# Patient Record
Sex: FEMALE | Race: WHITE | NOT HISPANIC OR LATINO | Employment: FULL TIME | URBAN - METROPOLITAN AREA
[De-identification: names, ages, dates, MRNs, and addresses within clinical notes are randomized per-mention and may not be internally consistent; named-entity substitution may affect disease eponyms.]

---

## 2022-07-15 ENCOUNTER — HOSPITAL ENCOUNTER (INPATIENT)
Facility: HOSPITAL | Age: 52
LOS: 2 days | Discharge: PSYCHIATRIC OR SUBSTANCE USE FACILITY | DRG: 917 | End: 2022-07-17
Attending: STUDENT IN AN ORGANIZED HEALTH CARE EDUCATION/TRAINING PROGRAM | Admitting: INTERNAL MEDICINE

## 2022-07-15 DIAGNOSIS — T39.1X2A ACETAMINOPHEN OVERDOSE, INTENTIONAL SELF-HARM, INITIAL ENCOUNTER: ICD-10-CM

## 2022-07-15 DIAGNOSIS — R45.851 SUICIDAL IDEATION: ICD-10-CM

## 2022-07-15 DIAGNOSIS — T50.902A INTENTIONAL DRUG OVERDOSE, INITIAL ENCOUNTER: Primary | ICD-10-CM

## 2022-07-15 DIAGNOSIS — T48.1X2A: ICD-10-CM

## 2022-07-15 DIAGNOSIS — T50.901A OVERDOSE: ICD-10-CM

## 2022-07-15 DIAGNOSIS — R07.9 CHEST PAIN: ICD-10-CM

## 2022-07-15 DIAGNOSIS — T40.602A INTENTIONAL OPIATE OVERDOSE, INITIAL ENCOUNTER: ICD-10-CM

## 2022-07-15 DIAGNOSIS — Z00.8 MEDICAL CLEARANCE FOR PSYCHIATRIC ADMISSION: ICD-10-CM

## 2022-07-15 PROBLEM — K21.9 GASTROESOPHAGEAL REFLUX DISEASE: Status: ACTIVE | Noted: 2022-07-15

## 2022-07-15 PROBLEM — Z80.0 FAMILY HISTORY OF COLON CANCER: Status: ACTIVE | Noted: 2022-01-21

## 2022-07-15 PROBLEM — Z90.710 HISTORY OF HYSTERECTOMY: Status: ACTIVE | Noted: 2022-01-21

## 2022-07-15 PROBLEM — Z72.0 TOBACCO USER: Status: ACTIVE | Noted: 2022-07-15

## 2022-07-15 PROBLEM — I10 PRIMARY HYPERTENSION: Status: ACTIVE | Noted: 2022-02-04

## 2022-07-15 PROBLEM — F32.A ACUTE DEPRESSION: Status: ACTIVE | Noted: 2022-07-15

## 2022-07-15 PROBLEM — M51.06 INTERVERTEBRAL DISC DISORDER OF LUMBAR REGION WITH MYELOPATHY: Status: ACTIVE | Noted: 2022-07-15

## 2022-07-15 PROBLEM — F31.9 BIPOLAR DISORDER: Status: ACTIVE | Noted: 2022-01-21

## 2022-07-15 LAB
ALBUMIN SERPL-MCNC: 3.8 GM/DL (ref 3.5–5)
ALBUMIN/GLOB SERPL: 1.4 RATIO (ref 1.1–2)
ALP SERPL-CCNC: 57 UNIT/L (ref 40–150)
ALT SERPL-CCNC: 27 UNIT/L (ref 0–55)
AMPHET UR QL SCN: NEGATIVE
APAP SERPL-MCNC: 148.5 UG/ML (ref 17.4–30)
APAP SERPL-MCNC: 74.1 UG/ML (ref 17.4–30)
APPEARANCE UR: CLEAR
APTT PPP: 26.1 SECONDS (ref 23.4–33.9)
AST SERPL-CCNC: 40 UNIT/L (ref 5–34)
BARBITURATE SCN PRESENT UR: NEGATIVE
BASE EXCESS ARTERIAL: -2 MMOL/L (ref -2–2)
BASE EXCESS ARTERIAL: ABNORMAL
BASOPHILS # BLD AUTO: 0.01 X10(3)/MCL (ref 0–0.2)
BASOPHILS NFR BLD AUTO: 0.2 %
BENZODIAZ UR QL SCN: NEGATIVE
BILIRUB UR QL STRIP.AUTO: NEGATIVE MG/DL
BILIRUBIN DIRECT+TOT PNL SERPL-MCNC: 0.2 MG/DL
BUN SERPL-MCNC: 5.8 MG/DL (ref 9.8–20.1)
CALCIUM SERPL-MCNC: 9 MG/DL (ref 8.4–10.2)
CANNABINOIDS UR QL SCN: NEGATIVE
CHLORIDE SERPL-SCNC: 102 MMOL/L (ref 98–107)
CO2 SERPL-SCNC: 25 MMOL/L (ref 22–29)
CO2 TOTAL: 25
COCAINE UR QL SCN: NEGATIVE
COLOR UR AUTO: ABNORMAL
CREAT SERPL-MCNC: 0.62 MG/DL (ref 0.55–1.02)
EOSINOPHIL # BLD AUTO: 0.02 X10(3)/MCL (ref 0–0.9)
EOSINOPHIL NFR BLD AUTO: 0.3 %
ERYTHROCYTE [DISTWIDTH] IN BLOOD BY AUTOMATED COUNT: 12.9 % (ref 11.5–17)
ETHANOL SERPL-MCNC: 11 MG/DL
GLOBULIN SER-MCNC: 2.8 GM/DL (ref 2.4–3.5)
GLUCOSE SERPL-MCNC: 164 MG/DL (ref 74–100)
GLUCOSE UR QL STRIP.AUTO: 250 MG/DL
HCO3 ARTERIAL: 24 MMOL/L (ref 18–23)
HCO3 ARTERIAL: ABNORMAL
HCO3 UR-SCNC: 24 MMOL/L (ref 24–28)
HCO3 UR-SCNC: 25.6 MMOL/L (ref 24–28)
HCT VFR BLD AUTO: 38 % (ref 37–47)
HGB BLD-MCNC: 12.8 GM/DL (ref 12–16)
IMM GRANULOCYTES # BLD AUTO: 0.01 X10(3)/MCL (ref 0–0.04)
IMM GRANULOCYTES NFR BLD AUTO: 0.2 %
INR BLD: 0.95 (ref 2–3)
KETONES UR QL STRIP.AUTO: NEGATIVE MG/DL
LEUKOCYTE ESTERASE UR QL STRIP.AUTO: NEGATIVE UNIT/L
LYMPHOCYTES # BLD AUTO: 1.79 X10(3)/MCL (ref 0.6–4.6)
LYMPHOCYTES NFR BLD AUTO: 28.6 %
MCH RBC QN AUTO: 30.8 PG (ref 27–31)
MCHC RBC AUTO-ENTMCNC: 33.7 MG/DL (ref 33–36)
MCV RBC AUTO: 91.3 FL (ref 80–94)
MDMA UR QL SCN: NEGATIVE
MONOCYTES # BLD AUTO: 0.41 X10(3)/MCL (ref 0.1–1.3)
MONOCYTES NFR BLD AUTO: 6.6 %
NEUTROPHILS # BLD AUTO: 4 X10(3)/MCL (ref 2.1–9.2)
NEUTROPHILS NFR BLD AUTO: 64.1 %
NITRITE UR QL STRIP.AUTO: NEGATIVE
NRBC BLD AUTO-RTO: 0 %
O2 SATURATION ARTERIAL: 93 % (ref 95–98)
OPIATES UR QL SCN: POSITIVE
PCO2 ARTERIAL: 45 MM HG (ref 35–45)
PCO2 BLDA: 44.9 MMHG (ref 35–45)
PCO2 BLDA: 45.2 MMHG (ref 35–45)
PCO2 BLDA: ABNORMAL MM[HG]
PCP UR QL: NEGATIVE
PH ARTERIAL: 7.33 (ref 7.35–7.45)
PH SMN: 7.33 [PH] (ref 7.35–7.45)
PH SMN: 7.36 [PH] (ref 7.35–7.45)
PH UR STRIP.AUTO: 6 [PH]
PH UR: 6 [PH] (ref 3–11)
PLATELET # BLD AUTO: 263 X10(3)/MCL (ref 130–400)
PMV BLD AUTO: 9.7 FL (ref 7.4–10.4)
PO2 ARTERIAL: 72 MM HG (ref 83–108)
PO2 BLDA: 72 MMHG (ref 80–100)
PO2 BLDA: 78 MMHG (ref 80–100)
POC BE: -2 MMOL/L
POC BE: 0 MMOL/L
POC COHB: ABNORMAL
POC METHB: ABNORMAL
POC O2HB: ABNORMAL
POC SATURATED O2: 93 % (ref 95–100)
POC SATURATED O2: 95 % (ref 95–100)
POC TCO2: 25 MMOL/L (ref 23–27)
POC TCO2: 27 MMOL/L (ref 23–27)
POTASSIUM SERPL-SCNC: 3.3 MMOL/L (ref 3.5–5.1)
PROT SERPL-MCNC: 6.6 GM/DL (ref 6.4–8.3)
PROT UR QL STRIP.AUTO: NEGATIVE MG/DL
PROTHROMBIN TIME: 12.5 SECONDS (ref 11.7–14.5)
RBC # BLD AUTO: 4.16 X10(6)/MCL (ref 4.2–5.4)
RBC UR QL AUTO: NEGATIVE UNIT/L
SALICYLATES SERPL-MCNC: <5 MG/DL
SAMPLE: ABNORMAL
SAMPLE: ABNORMAL
SARS-COV-2 RDRP RESP QL NAA+PROBE: NEGATIVE
SATURATED O2 ARTERIAL, I-STAT: ABNORMAL
SODIUM SERPL-SCNC: 141 MMOL/L (ref 136–145)
SP GR UR STRIP.AUTO: 1.02
SPECIFIC GRAVITY, URINE AUTO (.000) (OHS): 1.02 (ref 1–1.03)
TSH SERPL-ACNC: 0.79 UIU/ML (ref 0.35–4.94)
UROBILINOGEN UR STRIP-ACNC: 0.2 MG/DL
WBC # SPEC AUTO: 6.3 X10(3)/MCL (ref 4.5–11.5)

## 2022-07-15 PROCEDURE — 80307 DRUG TEST PRSMV CHEM ANLYZR: CPT | Performed by: STUDENT IN AN ORGANIZED HEALTH CARE EDUCATION/TRAINING PROGRAM

## 2022-07-15 PROCEDURE — 99900035 HC TECH TIME PER 15 MIN (STAT)

## 2022-07-15 PROCEDURE — 80143 DRUG ASSAY ACETAMINOPHEN: CPT | Performed by: INTERNAL MEDICINE

## 2022-07-15 PROCEDURE — 36600 WITHDRAWAL OF ARTERIAL BLOOD: CPT

## 2022-07-15 PROCEDURE — 85730 THROMBOPLASTIN TIME PARTIAL: CPT | Performed by: STUDENT IN AN ORGANIZED HEALTH CARE EDUCATION/TRAINING PROGRAM

## 2022-07-15 PROCEDURE — 93010 ELECTROCARDIOGRAM REPORT: CPT | Mod: ,,, | Performed by: INTERNAL MEDICINE

## 2022-07-15 PROCEDURE — 87635 SARS-COV-2 COVID-19 AMP PRB: CPT | Performed by: STUDENT IN AN ORGANIZED HEALTH CARE EDUCATION/TRAINING PROGRAM

## 2022-07-15 PROCEDURE — 82803 BLOOD GASES ANY COMBINATION: CPT

## 2022-07-15 PROCEDURE — 81003 URINALYSIS AUTO W/O SCOPE: CPT | Performed by: STUDENT IN AN ORGANIZED HEALTH CARE EDUCATION/TRAINING PROGRAM

## 2022-07-15 PROCEDURE — 93010 EKG 12-LEAD: ICD-10-PCS | Mod: ,,, | Performed by: INTERNAL MEDICINE

## 2022-07-15 PROCEDURE — 80053 COMPREHEN METABOLIC PANEL: CPT | Performed by: STUDENT IN AN ORGANIZED HEALTH CARE EDUCATION/TRAINING PROGRAM

## 2022-07-15 PROCEDURE — 63600175 PHARM REV CODE 636 W HCPCS: Performed by: STUDENT IN AN ORGANIZED HEALTH CARE EDUCATION/TRAINING PROGRAM

## 2022-07-15 PROCEDURE — 99291 CRITICAL CARE FIRST HOUR: CPT | Mod: 25

## 2022-07-15 PROCEDURE — 25000003 PHARM REV CODE 250: Performed by: STUDENT IN AN ORGANIZED HEALTH CARE EDUCATION/TRAINING PROGRAM

## 2022-07-15 PROCEDURE — 63600175 PHARM REV CODE 636 W HCPCS: Performed by: INTERNAL MEDICINE

## 2022-07-15 PROCEDURE — 80179 DRUG ASSAY SALICYLATE: CPT | Performed by: STUDENT IN AN ORGANIZED HEALTH CARE EDUCATION/TRAINING PROGRAM

## 2022-07-15 PROCEDURE — 93005 ELECTROCARDIOGRAM TRACING: CPT

## 2022-07-15 PROCEDURE — 85025 COMPLETE CBC W/AUTO DIFF WBC: CPT | Performed by: STUDENT IN AN ORGANIZED HEALTH CARE EDUCATION/TRAINING PROGRAM

## 2022-07-15 PROCEDURE — 82077 ASSAY SPEC XCP UR&BREATH IA: CPT | Performed by: STUDENT IN AN ORGANIZED HEALTH CARE EDUCATION/TRAINING PROGRAM

## 2022-07-15 PROCEDURE — 84443 ASSAY THYROID STIM HORMONE: CPT | Performed by: STUDENT IN AN ORGANIZED HEALTH CARE EDUCATION/TRAINING PROGRAM

## 2022-07-15 PROCEDURE — 96374 THER/PROPH/DIAG INJ IV PUSH: CPT

## 2022-07-15 PROCEDURE — 96361 HYDRATE IV INFUSION ADD-ON: CPT

## 2022-07-15 PROCEDURE — 36415 COLL VENOUS BLD VENIPUNCTURE: CPT | Performed by: STUDENT IN AN ORGANIZED HEALTH CARE EDUCATION/TRAINING PROGRAM

## 2022-07-15 PROCEDURE — 20000000 HC ICU ROOM

## 2022-07-15 PROCEDURE — 85610 PROTHROMBIN TIME: CPT | Performed by: STUDENT IN AN ORGANIZED HEALTH CARE EDUCATION/TRAINING PROGRAM

## 2022-07-15 PROCEDURE — 80143 DRUG ASSAY ACETAMINOPHEN: CPT | Performed by: STUDENT IN AN ORGANIZED HEALTH CARE EDUCATION/TRAINING PROGRAM

## 2022-07-15 RX ORDER — NALOXONE HCL 0.4 MG/ML
1 VIAL (ML) INJECTION
Status: COMPLETED | OUTPATIENT
Start: 2022-07-15 | End: 2022-07-15

## 2022-07-15 RX ORDER — SODIUM CHLORIDE, SODIUM LACTATE, POTASSIUM CHLORIDE, CALCIUM CHLORIDE 600; 310; 30; 20 MG/100ML; MG/100ML; MG/100ML; MG/100ML
INJECTION, SOLUTION INTRAVENOUS CONTINUOUS
Status: DISCONTINUED | OUTPATIENT
Start: 2022-07-15 | End: 2022-07-17

## 2022-07-15 RX ORDER — VENLAFAXINE HYDROCHLORIDE 150 MG/1
150 TABLET, EXTENDED RELEASE ORAL
Status: ON HOLD | COMMUNITY
Start: 2021-05-17 | End: 2022-07-17 | Stop reason: HOSPADM

## 2022-07-15 RX ORDER — GABAPENTIN 300 MG/1
300 CAPSULE ORAL NIGHTLY
Status: ON HOLD | COMMUNITY
Start: 2022-06-07 | End: 2022-07-17 | Stop reason: HOSPADM

## 2022-07-15 RX ORDER — ONDANSETRON 2 MG/ML
4 INJECTION INTRAMUSCULAR; INTRAVENOUS EVERY 8 HOURS PRN
Status: DISCONTINUED | OUTPATIENT
Start: 2022-07-15 | End: 2022-07-17 | Stop reason: HOSPADM

## 2022-07-15 RX ORDER — SERTRALINE HYDROCHLORIDE 100 MG/1
100 TABLET, FILM COATED ORAL DAILY
Status: ON HOLD | COMMUNITY
Start: 2022-03-10 | End: 2022-07-21 | Stop reason: SDUPTHER

## 2022-07-15 RX ORDER — HYDROXYZINE PAMOATE 25 MG/1
25 CAPSULE ORAL EVERY 6 HOURS PRN
Status: ON HOLD | COMMUNITY
Start: 2022-06-20 | End: 2022-07-17 | Stop reason: HOSPADM

## 2022-07-15 RX ORDER — AMLODIPINE BESYLATE 10 MG/1
10 TABLET ORAL DAILY
Status: ON HOLD | COMMUNITY
Start: 2022-05-26 | End: 2022-07-17 | Stop reason: HOSPADM

## 2022-07-15 RX ORDER — CYCLOBENZAPRINE HCL 10 MG
10 TABLET ORAL NIGHTLY
Status: ON HOLD | COMMUNITY
Start: 2022-07-07 | End: 2022-07-17 | Stop reason: HOSPADM

## 2022-07-15 RX ORDER — PANTOPRAZOLE SODIUM 40 MG/1
40 TABLET, DELAYED RELEASE ORAL
COMMUNITY
Start: 2021-05-17

## 2022-07-15 RX ORDER — LAMOTRIGINE 100 MG/1
100 TABLET ORAL
Status: ON HOLD | COMMUNITY
Start: 2021-11-27 | End: 2022-07-17 | Stop reason: HOSPADM

## 2022-07-15 RX ORDER — NALOXONE HCL 0.4 MG/ML
2 VIAL (ML) INJECTION
Status: COMPLETED | OUTPATIENT
Start: 2022-07-15 | End: 2022-07-15

## 2022-07-15 RX ORDER — TRAZODONE HYDROCHLORIDE 100 MG/1
100 TABLET ORAL
Status: ON HOLD | COMMUNITY
Start: 2021-05-17 | End: 2022-07-17 | Stop reason: HOSPADM

## 2022-07-15 RX ORDER — HYDROCODONE BITARTRATE AND ACETAMINOPHEN 10; 325 MG/1; MG/1
1 TABLET ORAL 3 TIMES DAILY
Status: ON HOLD | COMMUNITY
Start: 2022-07-11 | End: 2022-07-17 | Stop reason: HOSPADM

## 2022-07-15 RX ORDER — SODIUM CHLORIDE 0.9 % (FLUSH) 0.9 %
10 SYRINGE (ML) INJECTION
Status: DISCONTINUED | OUTPATIENT
Start: 2022-07-15 | End: 2022-07-17 | Stop reason: HOSPADM

## 2022-07-15 RX ORDER — MELOXICAM 15 MG/1
15 TABLET ORAL DAILY
Status: ON HOLD | COMMUNITY
Start: 2022-03-10 | End: 2022-07-17 | Stop reason: HOSPADM

## 2022-07-15 RX ORDER — SODIUM CHLORIDE, SODIUM LACTATE, POTASSIUM CHLORIDE, CALCIUM CHLORIDE 600; 310; 30; 20 MG/100ML; MG/100ML; MG/100ML; MG/100ML
1000 INJECTION, SOLUTION INTRAVENOUS
Status: COMPLETED | OUTPATIENT
Start: 2022-07-15 | End: 2022-07-15

## 2022-07-15 RX ADMIN — SODIUM CHLORIDE, POTASSIUM CHLORIDE, SODIUM LACTATE AND CALCIUM CHLORIDE 1000 ML: 600; 310; 30; 20 INJECTION, SOLUTION INTRAVENOUS at 06:07

## 2022-07-15 RX ADMIN — SODIUM CHLORIDE, POTASSIUM CHLORIDE, SODIUM LACTATE AND CALCIUM CHLORIDE: 600; 310; 30; 20 INJECTION, SOLUTION INTRAVENOUS at 09:07

## 2022-07-15 RX ADMIN — SODIUM CHLORIDE, POTASSIUM CHLORIDE, SODIUM LACTATE AND CALCIUM CHLORIDE 1000 ML: 600; 310; 30; 20 INJECTION, SOLUTION INTRAVENOUS at 05:07

## 2022-07-15 RX ADMIN — NALOXONE HYDROCHLORIDE 1 MG: 0.4 INJECTION, SOLUTION INTRAMUSCULAR; INTRAVENOUS; SUBCUTANEOUS at 09:07

## 2022-07-15 RX ADMIN — NALXONE HYDROCHLORIDE 2 MG: 0.4 INJECTION INTRAMUSCULAR; INTRAVENOUS; SUBCUTANEOUS at 06:07

## 2022-07-15 RX ADMIN — POISON ADSORBENT 100 G: 50 SUSPENSION ORAL at 05:07

## 2022-07-15 NOTE — ED NOTES
Pt in per [facility] with intentional ingestion of an unknown amount of Norco 10 mg tablets and flexeril 10mg tablets. Pt is noted to be lethargic and a poor historian. Pt placed on contiounous monitoring for cardiac, pulse ox and bp

## 2022-07-15 NOTE — ED PROVIDER NOTES
Encounter Date: 7/15/2022       History     Chief Complaint   Patient presents with    Drug Overdose     EMS reports that [facility] went to evict the pt she took handful of Norco , and a hand ful Flexeril 10mg, 1mg narcan given IV per EMS     The history is provided by the patient and the EMS personnel.   Drug Overdose  This is a new problem. The current episode started less than 1 hour ago. The problem occurs constantly. The problem has not changed since onset.Pertinent negatives include no chest pain, no abdominal pain, no headaches and no shortness of breath. Nothing aggravates the symptoms. Nothing relieves the symptoms. She has tried nothing for the symptoms.     This is a 51-year-old female who presents emergency department after a intentional overdose.  Patient took a handful of Norco 10 as well as Flexeril 10s.  Patient had a prescription filled for 90 Norco 10 on 07/07 and the bottle was empty today.  She also had a prescription filled on 07/07 for 60 Flexeril 10 mg.  Both bottles were empty.  Patient did receive 1 mg of IV Narcan with improvement mentation status.  Patient has vomited multiple times since being emergency department with toe fragments.  Patient states that everything started happening today so she tried to kill herself by overdose.    Review of patient's allergies indicates:   Allergen Reactions    Tetracyclines Rash     History reviewed. No pertinent past medical history.  History reviewed. No pertinent surgical history.  History reviewed. No pertinent family history.  Social History     Tobacco Use    Smoking status: Current Some Day Smoker    Smokeless tobacco: Never Used     Review of Systems   Constitutional: Negative for fever.   Respiratory: Negative for shortness of breath.    Cardiovascular: Negative for chest pain.   Gastrointestinal: Positive for nausea and vomiting. Negative for abdominal pain.   Neurological: Negative for headaches.   Psychiatric/Behavioral:  Positive for self-injury and suicidal ideas.   All other systems reviewed and are negative.      Physical Exam     Initial Vitals [07/15/22 1631]   BP Pulse Resp Temp SpO2   (!) 143/120 108 16 98.8 °F (37.1 °C) 96 %      MAP       --         Physical Exam    Nursing note and vitals reviewed.  Constitutional: She appears well-developed and well-nourished. No distress.   Cardiovascular: Normal rate and regular rhythm.   Pulmonary/Chest: Breath sounds normal. No respiratory distress.   Abdominal: Bowel sounds are normal. There is no abdominal tenderness.   Musculoskeletal:         General: No tenderness. Normal range of motion.     Neurological: She is alert. GCS score is 15. GCS eye subscore is 4. GCS verbal subscore is 5. GCS motor subscore is 6.   Skin: Skin is warm. Capillary refill takes less than 2 seconds.   Psychiatric: She exhibits a depressed mood. She expresses suicidal ideation. She expresses suicidal plans.         ED Course   Critical Care    Date/Time: 7/15/2022 5:22 PM  Performed by: Bry Ko MD  Authorized by: Bry Ko MD   Direct patient critical care time: 35 minutes  Additional history critical care time: 5 minutes  Ordering / reviewing critical care time: 2 minutes  Documentation critical care time: 2 minutes  Consulting other physicians critical care time: 2 minutes  Total critical care time (exclusive of procedural time) : 46 minutes  Critical care time was exclusive of separately billable procedures and treating other patients.  Critical care was necessary to treat or prevent imminent or life-threatening deterioration of the following conditions: toxidrome.  Critical care was time spent personally by me on the following activities: blood draw for specimens, development of treatment plan with patient or surrogate, discussions with consultants, gastric intubation, interpretation of cardiac output measurements, evaluation of patient's response to treatment, examination of  patient, ordering and performing treatments and interventions, obtaining history from patient or surrogate, ordering and review of laboratory studies, re-evaluation of patient's condition and pulse oximetry.        Labs Reviewed   COMPREHENSIVE METABOLIC PANEL - Abnormal; Notable for the following components:       Result Value    Potassium Level 3.3 (*)     Glucose Level 164 (*)     Blood Urea Nitrogen 5.8 (*)     Aspartate Aminotransferase 40 (*)     All other components within normal limits   URINALYSIS, REFLEX TO URINE CULTURE - Abnormal; Notable for the following components:    Color, UA Straw (*)     Glucose,   (*)     All other components within normal limits   DRUG SCREEN, URINE (BEAKER) - Abnormal; Notable for the following components:    Opiates, Urine Positive (*)     All other components within normal limits    Narrative:     Cut off concentrations:    Amphetamines - 1000 ng/ml  Barbiturates - 200 ng/ml  Benzodiazepine - 200 ng/ml  Cannabinoids (THC) - 50 ng/ml  Cocaine - 300 ng/ml  Fentanyl - 1.0 ng/ml  MDMA - 500 ng/ml  Opiates - 300 ng/ml   Phencyclidine (PCP) - 25 ng/ml    Specimen submitted for drug analysis and tested for pH and specific gravity in order to evaluate sample integrity. Suspect tampering if specific gravity is <1.003 and/or pH is not within the range of 4.5 - 8.0  False negatives may result form substances such as bleach added to urine.  False positives may result for the presence of a substance with similar chemical structure to the drug or its metabolite.    This test provides only a PRELIMINARY analytical test result. A more specific alternate chemical method must be used in order to obtain a confirmed analytical result. Gas chromatography/mass spectrometry (GC/MS) is the preferred confirmatory method. Other chemical confirmation methods are available. Clinical consideration and professional judgement should be applied to any drug of abuse test result, particularly when  preliminary positive results are used.    Positive results will be confirmed only at the physicians request. Unconfirmed screening results are to be used only for medical purposes (treatment).        ALCOHOL,MEDICAL (ETHANOL) - Abnormal; Notable for the following components:    Ethanol Level 11.0 (*)     All other components within normal limits   ACETAMINOPHEN LEVEL - Abnormal; Notable for the following components:    Acetaminophen Level 148.5 (*)     All other components within normal limits   CBC WITH DIFFERENTIAL - Abnormal; Notable for the following components:    RBC 4.16 (*)     All other components within normal limits   PROTIME-INR - Abnormal; Notable for the following components:    INR 0.95 (*)     All other components within normal limits   ACETAMINOPHEN LEVEL - Abnormal; Notable for the following components:    Acetaminophen Level 74.1 (*)     All other components within normal limits   ISTAT PROCEDURE - Abnormal; Notable for the following components:    POC PO2 78 (*)     All other components within normal limits   ISTAT PROCEDURE - Abnormal; Notable for the following components:    POC PH 7.333 (*)     POC PCO2 45.2 (*)     POC PO2 72 (*)     POC SATURATED O2 93 (*)     All other components within normal limits   TSH - Normal   SARS-COV-2 RNA AMPLIFICATION, QUAL - Normal   APTT - Normal   CBC W/ AUTO DIFFERENTIAL    Narrative:     The following orders were created for panel order CBC auto differential.  Procedure                               Abnormality         Status                     ---------                               -----------         ------                     CBC with Differential[[id number]]        Abnormal            Final result                 Please view results for these tests on the individual orders.   SALICYLATE LEVEL     EKG Readings: (Independently Interpreted)   Rhythm: Sinus Tachycardia. Heart Rate: 111. Ectopy: No Ectopy. Conduction: Normal. ST Segments: Normal ST Segments.  T Waves: Normal. Clinical Impression: Sinus Tachycardia     ECG Results          EKG 12-lead (Preliminary result)  Result time 07/15/22 19:42:11    ED Interpretation by Rigo Ventura DO (07/15/22 19:42:11, [facility] - Emergency Dept, Emergency Medicine)    Independent ECG Interpretation:    NSR at rate of 91. Normal intervals. Normal QRS. No acute ST or T wave abnormalities. Overall impression: No evidence of acute ischemia or arrhythmia.                               Imaging Results    None          Medications   lactated ringers infusion ( Intravenous New Bag 7/15/22 2100)   lactated ringers bolus 1,000 mL (0 mLs Intravenous Stopped 7/15/22 1800)   activated charcoal-sorbitoL 25 gram/120 mL suspension 100 g (100 g Oral Given 7/15/22 1700)   naloxone 0.4 mg/mL injection 2 mg (2 mg Intravenous Given 7/15/22 1800)   lactated ringers infusion (0 mLs Intravenous Stopped 7/15/22 2118)   naloxone 0.4 mg/mL injection 1 mg (1 mg Intravenous Given 7/15/22 2106)                 ED Course as of 07/15/22 2141   Fri Jul 15, 2022   1634 Nursing staff currently on the phone with poison Control [BS]   1655 Poison control recommended 100 g of activated charcoal.  Obtaining ABG.  Monitoring for CNS depression as well as respiratory depression.  Monitoring for fever, flushing and urinary retention in regards to the Flexeril.  Obtaining a 4 hour acetaminophen level. [BS]   1721 With patient's nausea will place NG tube for charcoal administration [BS]   1733 NG tube was unsuccessful by nursing staff with nasal bleeding that has stopped on his own.  Will get patient to try to drink the charcoal. [BS]   1733 Patient has ingested the pills at approximately 3-330 p.m..  It is unsure of exact time.  Patient was close to 2 hour hoa at time of presentation to the emergency department. [BS]   1745 Patient has drank 25 g so for.  Will continue to give more charcoal. [BS]   1758 Patient became unresponsive.  It is  removed her to trauma low O2 a pair for intubation.  Ordered 2 mg of IV Narcan.  Patient is now more responsive. [BS]   1804 Second to patient's decreased mental status and requiring Narcan and now vomiting will hold off on further charcoal.  Patient is> 3 hours status post ingestion at this time. [BS]   1906 This patient was discussed with Dr. Ko at shift change including presentation, testing thus far and pending testing and treatment  [MP]   1911 GCS of 12, plan is to repeat acetaminophen level at 2030, likely start N-Acetylcysteine based on that level [MP]   2058 Dr. Ventura will admit the patient to the hospital.  We discussed the care this far and patient's condition and results of testing.  Temporary bridge orders placed to expedite patient's transition of care.    [MP]      ED Course User Index  [BS] Bry Ko MD  [MP] Rigo Ventura,              Clinical Impression:   Final diagnoses:  [Z00.8] Medical clearance for psychiatric admission  [T50.902A] Intentional drug overdose, initial encounter (Primary)  [R45.851] Suicidal ideation  [T39.1X2A] Acetaminophen overdose, intentional self-harm, initial encounter  [R07.9] Chest pain  [T40.602A] Intentional opiate overdose, initial encounter  [T48.1X2A] Poisoning by skeletal muscle relaxant (neuromuscular blocking agent), intentional self-harm, initial encounter          ED Disposition Condition    Admit               Rigo Ventura DO  07/15/22 5994

## 2022-07-16 PROBLEM — F43.23 ADJUSTMENT DISORDER WITH MIXED ANXIETY AND DEPRESSED MOOD: Status: ACTIVE | Noted: 2022-07-16

## 2022-07-16 PROBLEM — R45.851 SUICIDAL IDEATION: Status: ACTIVE | Noted: 2022-07-16

## 2022-07-16 PROBLEM — F31.4 BIPOLAR I DISORDER WITH DEPRESSION, SEVERE: Status: ACTIVE | Noted: 2022-07-16

## 2022-07-16 PROBLEM — F33.9 MAJOR DEPRESSION, RECURRENT, CHRONIC: Status: ACTIVE | Noted: 2022-07-16

## 2022-07-16 LAB
ALBUMIN SERPL-MCNC: 3.1 GM/DL (ref 3.5–5)
ALBUMIN/GLOB SERPL: 1.5 RATIO (ref 1.1–2)
ALP SERPL-CCNC: 52 UNIT/L (ref 40–150)
ALT SERPL-CCNC: 72 UNIT/L (ref 0–55)
APAP SERPL-MCNC: <17.4 UG/ML (ref 17.4–30)
AST SERPL-CCNC: 76 UNIT/L (ref 5–34)
BILIRUBIN DIRECT+TOT PNL SERPL-MCNC: 0.3 MG/DL
BUN SERPL-MCNC: 5.6 MG/DL (ref 9.8–20.1)
CALCIUM SERPL-MCNC: 8.7 MG/DL (ref 8.4–10.2)
CHLORIDE SERPL-SCNC: 106 MMOL/L (ref 98–107)
CO2 SERPL-SCNC: 21 MMOL/L (ref 22–29)
CREAT SERPL-MCNC: 0.58 MG/DL (ref 0.55–1.02)
GLOBULIN SER-MCNC: 2.1 GM/DL (ref 2.4–3.5)
GLUCOSE SERPL-MCNC: 69 MG/DL (ref 74–100)
MAGNESIUM SERPL-MCNC: 1.7 MG/DL (ref 1.6–2.6)
PHOSPHATE SERPL-MCNC: 3.4 MG/DL (ref 2.3–4.7)
POTASSIUM SERPL-SCNC: 3.6 MMOL/L (ref 3.5–5.1)
PROT SERPL-MCNC: 5.2 GM/DL (ref 6.4–8.3)
SODIUM SERPL-SCNC: 138 MMOL/L (ref 136–145)

## 2022-07-16 PROCEDURE — 63600175 PHARM REV CODE 636 W HCPCS: Performed by: STUDENT IN AN ORGANIZED HEALTH CARE EDUCATION/TRAINING PROGRAM

## 2022-07-16 PROCEDURE — 20000000 HC ICU ROOM

## 2022-07-16 PROCEDURE — 80053 COMPREHEN METABOLIC PANEL: CPT | Performed by: STUDENT IN AN ORGANIZED HEALTH CARE EDUCATION/TRAINING PROGRAM

## 2022-07-16 PROCEDURE — 25000003 PHARM REV CODE 250: Performed by: NURSE PRACTITIONER

## 2022-07-16 PROCEDURE — 93010 ELECTROCARDIOGRAM REPORT: CPT | Mod: ,,, | Performed by: INTERNAL MEDICINE

## 2022-07-16 PROCEDURE — 93010 EKG 12-LEAD: ICD-10-PCS | Mod: ,,, | Performed by: INTERNAL MEDICINE

## 2022-07-16 PROCEDURE — 25000003 PHARM REV CODE 250: Performed by: STUDENT IN AN ORGANIZED HEALTH CARE EDUCATION/TRAINING PROGRAM

## 2022-07-16 PROCEDURE — 63600175 PHARM REV CODE 636 W HCPCS: Performed by: INTERNAL MEDICINE

## 2022-07-16 PROCEDURE — 84100 ASSAY OF PHOSPHORUS: CPT | Performed by: STUDENT IN AN ORGANIZED HEALTH CARE EDUCATION/TRAINING PROGRAM

## 2022-07-16 PROCEDURE — 36415 COLL VENOUS BLD VENIPUNCTURE: CPT | Performed by: STUDENT IN AN ORGANIZED HEALTH CARE EDUCATION/TRAINING PROGRAM

## 2022-07-16 PROCEDURE — 83735 ASSAY OF MAGNESIUM: CPT | Performed by: STUDENT IN AN ORGANIZED HEALTH CARE EDUCATION/TRAINING PROGRAM

## 2022-07-16 PROCEDURE — 25000003 PHARM REV CODE 250: Performed by: INTERNAL MEDICINE

## 2022-07-16 PROCEDURE — 93005 ELECTROCARDIOGRAM TRACING: CPT

## 2022-07-16 PROCEDURE — 80143 DRUG ASSAY ACETAMINOPHEN: CPT | Performed by: STUDENT IN AN ORGANIZED HEALTH CARE EDUCATION/TRAINING PROGRAM

## 2022-07-16 PROCEDURE — 94761 N-INVAS EAR/PLS OXIMETRY MLT: CPT

## 2022-07-16 RX ORDER — SODIUM CHLORIDE 0.9 % (FLUSH) 0.9 %
10 SYRINGE (ML) INJECTION
Status: DISCONTINUED | OUTPATIENT
Start: 2022-07-16 | End: 2022-07-17 | Stop reason: HOSPADM

## 2022-07-16 RX ORDER — PANTOPRAZOLE SODIUM 40 MG/1
40 TABLET, DELAYED RELEASE ORAL DAILY
Status: DISCONTINUED | OUTPATIENT
Start: 2022-07-17 | End: 2022-07-17 | Stop reason: HOSPADM

## 2022-07-16 RX ORDER — NALOXONE HCL 0.4 MG/ML
1 VIAL (ML) INJECTION
Status: DISCONTINUED | OUTPATIENT
Start: 2022-07-16 | End: 2022-07-17 | Stop reason: HOSPADM

## 2022-07-16 RX ORDER — SERTRALINE HYDROCHLORIDE 50 MG/1
100 TABLET, FILM COATED ORAL DAILY
Status: DISCONTINUED | OUTPATIENT
Start: 2022-07-17 | End: 2022-07-17 | Stop reason: HOSPADM

## 2022-07-16 RX ORDER — OXCARBAZEPINE 150 MG/1
150 TABLET, FILM COATED ORAL 2 TIMES DAILY
Status: DISCONTINUED | OUTPATIENT
Start: 2022-07-16 | End: 2022-07-17 | Stop reason: HOSPADM

## 2022-07-16 RX ORDER — GABAPENTIN 300 MG/1
300 CAPSULE ORAL NIGHTLY
Status: DISCONTINUED | OUTPATIENT
Start: 2022-07-16 | End: 2022-07-17 | Stop reason: HOSPADM

## 2022-07-16 RX ORDER — POTASSIUM CHLORIDE 14.9 MG/ML
20 INJECTION INTRAVENOUS ONCE
Status: COMPLETED | OUTPATIENT
Start: 2022-07-16 | End: 2022-07-16

## 2022-07-16 RX ORDER — POTASSIUM CHLORIDE 7.45 MG/ML
10 INJECTION INTRAVENOUS
Status: DISCONTINUED | OUTPATIENT
Start: 2022-07-16 | End: 2022-07-16

## 2022-07-16 RX ORDER — OLANZAPINE 5 MG/1
5 TABLET ORAL NIGHTLY
Status: DISCONTINUED | OUTPATIENT
Start: 2022-07-16 | End: 2022-07-17 | Stop reason: HOSPADM

## 2022-07-16 RX ORDER — OXYMETAZOLINE HCL 0.05 %
2 SPRAY, NON-AEROSOL (ML) NASAL 2 TIMES DAILY
Status: DISCONTINUED | OUTPATIENT
Start: 2022-07-16 | End: 2022-07-17 | Stop reason: HOSPADM

## 2022-07-16 RX ORDER — ENOXAPARIN SODIUM 100 MG/ML
40 INJECTION SUBCUTANEOUS EVERY 24 HOURS
Status: DISCONTINUED | OUTPATIENT
Start: 2022-07-16 | End: 2022-07-17 | Stop reason: HOSPADM

## 2022-07-16 RX ORDER — AMLODIPINE BESYLATE 5 MG/1
10 TABLET ORAL DAILY
Status: DISCONTINUED | OUTPATIENT
Start: 2022-07-17 | End: 2022-07-16

## 2022-07-16 RX ORDER — FAMOTIDINE 10 MG/ML
20 INJECTION INTRAVENOUS EVERY 12 HOURS
Status: DISCONTINUED | OUTPATIENT
Start: 2022-07-16 | End: 2022-07-17 | Stop reason: HOSPADM

## 2022-07-16 RX ORDER — POTASSIUM CHLORIDE 14.9 MG/ML
20 INJECTION INTRAVENOUS ONCE
Status: DISCONTINUED | OUTPATIENT
Start: 2022-07-16 | End: 2022-07-16

## 2022-07-16 RX ORDER — HYDRALAZINE HYDROCHLORIDE 20 MG/ML
10 INJECTION INTRAMUSCULAR; INTRAVENOUS
Status: DISCONTINUED | OUTPATIENT
Start: 2022-07-16 | End: 2022-07-17 | Stop reason: HOSPADM

## 2022-07-16 RX ORDER — LAMOTRIGINE 100 MG/1
100 TABLET ORAL DAILY
Status: DISCONTINUED | OUTPATIENT
Start: 2022-07-17 | End: 2022-07-16

## 2022-07-16 RX ADMIN — OXCARBAZEPINE 150 MG: 150 TABLET, FILM COATED ORAL at 08:07

## 2022-07-16 RX ADMIN — FAMOTIDINE 20 MG: 10 INJECTION INTRAVENOUS at 08:07

## 2022-07-16 RX ADMIN — ENOXAPARIN SODIUM 40 MG: 100 INJECTION SUBCUTANEOUS at 09:07

## 2022-07-16 RX ADMIN — POTASSIUM CHLORIDE 20 MEQ: 14.9 INJECTION, SOLUTION INTRAVENOUS at 03:07

## 2022-07-16 RX ADMIN — FAMOTIDINE 20 MG: 10 INJECTION INTRAVENOUS at 09:07

## 2022-07-16 RX ADMIN — OLANZAPINE 5 MG: 5 TABLET, FILM COATED ORAL at 08:07

## 2022-07-16 RX ADMIN — SODIUM CHLORIDE, POTASSIUM CHLORIDE, SODIUM LACTATE AND CALCIUM CHLORIDE: 600; 310; 30; 20 INJECTION, SOLUTION INTRAVENOUS at 03:07

## 2022-07-16 RX ADMIN — GABAPENTIN 300 MG: 300 CAPSULE ORAL at 08:07

## 2022-07-16 RX ADMIN — Medication 2 SPRAY: at 09:07

## 2022-07-16 RX ADMIN — Medication 2 SPRAY: at 08:07

## 2022-07-16 NOTE — H&P
[facility] Hospital Medicine History & Physical Examination       Patient Name: Yany Yuan  MRN: [id number]  Patient Class: IP- Inpatient   Admission Date: 7/15/2022   Admitting Physician: SEVEN Service   Length of Stay: 1  Attending Physician: Kalani Potter MD  Primary Care Provider: Primary Doctor No  Face-to-Face encounter date: 07/16/2022  Code Status:Good Outcome Following Attempted Resuscitation (GO-FAR) Score:  The Good Outcome Following Attempted Resuscitation (GO-FAR) score provides validated risk stratification for a patients chance of neurologically-intact survival to discharge should he/she be successfully resuscitated following in-hospital cardiopulmonary arrest. The clinical significance of the GO-FAR score may be discussed with the patient and/or family while coming to a decision about code status.     Age: <70      Interpretation Key:   GO-FAR Score       Likelihood of NISD       -15 to - 6       Above average > 15 %         - 5 to 13       Average     >3 to 15 %         14 to 23       Low                1 to 3 %               > 23       Very low             < 1 %            Chief Complaint: Drug Overdose (EMS reports that [place] office went to evict the pt she took handful of Norco , and a hand ful Flexeril 10mg, 1mg narcan given IV per EMS)        Patient information was obtained from patient, patient's family, past medical records and ER records.     HISTORY OF PRESENT ILLNESS:   Yany Yuan is a 51 y.o. female   51-year-old white female with past medical history of chronic back pain, lumbar myelopathy, GERD, hypertension, anxiety, depression, bipolar disorder who was admitted to ICU after an overdose attempt after she got really upset with her boyfriend patient took handful of Norco 10 and Flexeril apparently patient was prescribed Norco 10s 90 pills on July 7th and Flexeril 60 pills on the same day and both the bottles were found to be empty.  Patient  was given IV Narcan with improvement in the mentation status poison Control Center was contacted outside the ED patient received 25 g of activated charcoal and Narcan patient was initially admitted to ICU with this closer monitoring of neuro checks patient did not require any vasopressor support or any mechanical ventilation she stayed stable so she has been downgraded to hospitalist service now for further management and care.  Patient is PEC'd acetaminophen level was found to be elevated but now it is down to less than 17.4   PAST MEDICAL HISTORY:   Chronic back pain, bipolar disorder, history of depression  PAST SURGICAL HISTORY:   History reviewed. No pertinent surgical history.    ALLERGIES:   Tetracyclines    FAMILY HISTORY:   Reviewed and negative    SOCIAL HISTORY:     Social History     Tobacco Use    Smoking status: Current Some Day Smoker    Smokeless tobacco: Never Used   Substance Use Topics    Alcohol use: Not on file        HOME MEDICATIONS:     Prior to Admission medications    Medication Sig Start Date End Date Taking? Authorizing Provider   lamoTRIgine (LAMICTAL) 100 MG tablet Take 100 mg by mouth. 11/27/21  Yes Historical Provider   pantoprazole (PROTONIX) 40 MG tablet Take 40 mg by mouth. 5/17/21  Yes Historical Provider   traZODone (DESYREL) 100 MG tablet Take 100 mg by mouth. 5/17/21  Yes Historical Provider   venlafaxine 150 mg TR24 Take 150 mg by mouth. 5/17/21  Yes Historical Provider   amLODIPine (NORVASC) 10 MG tablet Take 10 mg by mouth once daily. 5/26/22   Historical Provider   cyclobenzaprine (FLEXERIL) 10 MG tablet Take 10 mg by mouth nightly. 7/7/22   Historical Provider   gabapentin (NEURONTIN) 300 MG capsule Take 300 mg by mouth nightly. 6/7/22   Historical Provider   HYDROcodone-acetaminophen (NORCO)  mg per tablet Take 1 tablet by mouth 3 (three) times daily. 7/11/22   Historical Provider   hydrOXYzine pamoate (VISTARIL) 25 MG Cap Take 25 mg by mouth every 6 (six) hours  as needed. 6/20/22   Historical Provider   meloxicam (MOBIC) 15 MG tablet Take 15 mg by mouth once daily. 3/10/22   Historical Provider   sertraline (ZOLOFT) 100 MG tablet Take 100 mg by mouth once daily. 3/10/22   Historical Provider       REVIEW OF SYSTEMS:   Except as documented, all other systems reviewed and negative     PHYSICAL EXAM:     VITAL SIGNS: 24 HRS MIN & MAX LAST   Temp  Min: 97.7 °F (36.5 °C)  Max: 98.9 °F (37.2 °C) 98.4 °F (36.9 °C)   BP  Min: 92/71  Max: 151/97 129/85   Pulse  Min: 85  Max: 115  97   Resp  Min: 9  Max: 25 13   SpO2  Min: 91 %  Max: 99 % (!) 91 %       General appearance: Well-developed, well-nourished female in no apparent distress.  HENT: Atraumatic head. Moist mucous membranes of oral cavity.  Eyes: Normal extraocular movements.   Neck: Supple.   Lungs: Clear to auscultation bilaterally. No wheezing present.   Heart: Regular rate and rhythm.   Abdomen: Soft, non-distended, non-tender. No rebound tenderness/guarding. Bowel sounds are normal.   Extremities: No cyanosis, clubbing, or edema.  Skin: No Rash.   Neuro: awake and alert  Psych/mental status: awake and alert    LABS AND IMAGING:     Recent Labs   Lab 07/15/22  1727   WBC 6.3   RBC 4.16*   HGB 12.8   HCT 38.0   MCV 91.3   MCH 30.8   MCHC 33.7   RDW 12.9      MPV 9.7       Recent Labs   Lab 07/15/22  1715 07/15/22  1727 07/15/22  2001 07/16/22  0710   NA  --  141  --  138   K  --  3.3*  --  3.6   CO2  --  25  --  21*   BUN  --  5.8*  --  5.6*   CREATININE  --  0.62  --  0.58   CALCIUM  --  9.0  --  8.7   PH 7.363  --  7.333*  --    MG  --   --   --  1.70   ALBUMIN  --  3.8  --  3.1*   ALKPHOS  --  57  --  52   ALT  --  27  --  72*   AST  --  40*  --  76*   BILITOT  --  0.2  --  0.3       Microbiology Results (last 7 days)     ** No results found for the last 168 hours. **           X-Ray Abdomen AP 1 View  XR Abdomen KUB 1 View     Indication: Abdominal pain     Technique: AP view of the abdomen     Comparison: CT  abdomen and pelvis 7/23/2016     Findings: No obvious free air. No dilated loops of bowel. Air is seen  distally. No suspicious masses or calcifications. The osseous  structures are unremarkable.     Impression: Nonspecific nonobstructive bowel gas pattern.        Electronically Signed By: Brandon Vazquez MD  Date/Time Signed: 12/12/2016 08:29  X-Ray Chest PA Lateral With Lordotic Vie  XR Chest 2 Views     Indication: Chest pain     Technique: PA and lateral views of the chest.     Comparison: None.     Findings: The cardiac silhouette is within normal limits. The  mediastinum is in the midline. No consolidation or edema. The  costophrenic angles are sharp. No pneumothorax. The bones and soft  tissues are unremarkable.     Impression: No radiographic evidence of an acute cardiopulmonary  process.        Electronically Signed By: Brandon Vazquez MD  Date/Time Signed: 12/12/2016 08:26        ASSESSMENT & PLAN:   Suicide attempt with Flexeril and Norco  Drug overdose  History of bipolar disorder  History of depression  History of chronic back pain and myelopathy on chronic pain medications  PEC   Essential hypertension    Continue with LR at 125 cc an hour  Pec in place  Hold off on Norvasc since patient's blood pressure is on the lower side  Will resume other home medications that includes  sertraline, Protonix and gabapentin    Prophylaxis:  Lovenox      VTE Prophylaxis: will be placed on Heparin/Lovenox/ Xarelto/ SCD for DVT prophylaxis and will be advised to be as mobile as possible and sit in a chair as tolerated    Patient condition:  Stable/Fair/Gaurded/ Serious/ Critical    __________________________________________________________________________  INPATIENT LIST OF MEDICATIONS     Scheduled Meds:   enoxaparin  40 mg Subcutaneous Daily    famotidine (PF)  20 mg Intravenous Q12H    oxymetazoline  2 spray Each Nostril BID     Continuous Infusions:   lactated ringers 125 mL/hr at 07/16/22 0338     PRN  Meds:.hydrALAZINE, naloxone, ondansetron, sodium chloride 0.9%, sodium chloride 0.9%      I, _ NP/PA have reviewed and discussed the case with  _   Please see the following addendum for further assessment and plan from there attending MD.    07/16/2022    ________________________________________________________________________________    MD Addendum:  IDr. ---assumed care of this patient today at ---am/pm  For the patient encounter, I performed the substantive portion of the visit, I reviewed the NP/PA documentation, treatment plan, and medical decision making.  I had face to face time with this patient     A. History:    B. Physical exam:    C. Medical decision making:    Discharge Planning and Disposition: No mobility needs. Ambulating well. Good social support system.   Anticipated discharge    All diagnosis and differential diagnosis have been reviewed; assessment and plan has been documented; I have personally reviewed the labs and test results that are presently available; I have reviewed the patients medication list; I have reviewed the consulting providers response and recommendations. I have reviewed or attempted to review medical records based upon their availability.    All of the patient and family questions have been addressed and answered. Patient's is agreeable to the above stated plan. I will continue to monitor closely and make adjustments to medical management as needed.      Kalani Potter MD   07/16/2022

## 2022-07-16 NOTE — H&P
[facility]  Pulmonary Critical Care Note    Patient Name: Yany Yuan  MRN: [id number]  Admission Date: 7/15/2022  Hospital Length of Stay: 1 days  Code Status: Full Code  Attending Provider: Clara Munoz MD  Primary Care Provider: Primary Doctor No     Subjective:     HPI:   51-year-old  female with PMH of chronic back pain, lumbar myelopathy, GERD, hypertension, anxiety/depression/bipolar, and tobacco use who was admitted to ICU as a transfer from [facility] after she presented there with overdose attempt. On 07/07 she recently filled 90 tablets of Norco 10 and 60 tablets of Flexeril 10 and both bottles were found to be empty.  Ingestion presumed to be around 3:00 p.m. on 7/15/22. Unable to obtain further history or review of systems secondary to patient's somnolence at this time.      She has remained hemodynamically stable, afebrile, and on 2 L nasal cannula.  Poison Control was contacted at outside ED and recommended activated charcoal, 100 g. Patient tolerated 25 g prior to nausea/vomiting.  She has also received 2 L of LR and 4 mg of Narcan.  Tylenol level initially 148.5 which has down trended to 74.1.  Admitted to ICU for close monitoring.  She is under a PEC.    Hospital Course/Significant events:  N/A    24 Hour Interval History:  N/A    History reviewed. No pertinent surgical history.     Social History     Socioeconomic History    Marital status: Unknown   Tobacco Use    Smoking status: Current Some Day Smoker    Smokeless tobacco: Never Used     Objective:     Current Outpatient Medications   Medication Instructions    amLODIPine (NORVASC) 10 mg, Oral, Daily    cyclobenzaprine (FLEXERIL) 10 mg, Oral, Nightly    gabapentin (NEURONTIN) 300 mg, Oral, Nightly    HYDROcodone-acetaminophen (NORCO)  mg per tablet 1 tablet, Oral, 3 times daily    hydrOXYzine pamoate (VISTARIL) 25 mg, Oral, Every 6 hours PRN    lamoTRIgine (LAMICTAL) 100 mg, Oral    meloxicam  (MOBIC) 15 mg, Oral, Daily    pantoprazole (PROTONIX) 40 mg, Oral    sertraline (ZOLOFT) 100 mg, Oral, Daily    traZODone (DESYREL) 100 mg, Oral    venlafaxine 150 mg, Oral       Current Inpatient Medications   enoxaparin  40 mg Subcutaneous Daily    famotidine (PF)  20 mg Intravenous Q12H    potassium chloride  10 mEq Intravenous Q1H     Infusions   lactated ringers 125 mL/hr at 07/15/22 2100     PRN  naloxone, ondansetron, sodium chloride 0.9%, sodium chloride 0.9%      Intake/Output Summary (Last 24 hours) at 7/16/2022 0136  Last data filed at 7/15/2022 2300  Gross per 24 hour   Intake 1000 ml   Output 850 ml   Net 150 ml       ROS   Unable to obtain 2/2 patient condition     Vital Signs (Most Recent):  Temp: 98.9 °F (37.2 °C) (07/15/22 2330)  Pulse: 99 (07/16/22 0015)  Resp: 15 (07/16/22 0015)  BP: 120/74 (07/16/22 0015)  SpO2: 97 % (07/16/22 0015)  Body mass index is 25.35 kg/m².  Weight: 67 kg (147 lb 11.3 oz) Vital Signs (24h Range):  Temp:  [97.7 °F (36.5 °C)-98.9 °F (37.2 °C)] 98.9 °F (37.2 °C)  Pulse:  [] 99  Resp:  [10-20] 15  SpO2:  [94 %-99 %] 97 %  BP: (111-143)/() 120/74     Physical Exam  General:  Well-nourished, well-developed, no acute distress, on 2L NC  HEENT: Normocephalic, atraumatic.  PERRLA  Neck: Supple. No obvious JVD.  Normal range of motion.  Respiratory: CTAB.  No obvious crackles wheeze or rhonchi.  Normal work of breathing.  Cardiovascular:  RRR.  No obvious M/G/R. Warm extremities.  Peripheral pulses intact.  No edema.  Gastrointestinal:  Soft, nontender, nondistended.  Normal bowel sounds.  Neurologic:  Somnolent.  Arousable.  Follows some commands.  No FND.     Mechanical ventilation support:   N/A on 2L NC     Lines/Drains/Airways     Drain  Duration                Urethral Catheter 07/15/22 1949 16 Fr. <1 day          Peripheral Intravenous Line  Duration                Peripheral IV - Single Lumen 07/15/22 1600 20 G Left;Posterior Hand <1 day          Peripheral IV - Single Lumen 07/15/22 1630 20 G Right Antecubital <1 day              Significant Labs:    Lab Results   Component Value Date    WBC 6.3 07/15/2022    HGB 12.8 07/15/2022    HCT 38.0 07/15/2022    MCV 91.3 07/15/2022     07/15/2022     BMP  Lab Results   Component Value Date     07/15/2022    K 3.3 (L) 07/15/2022    CO2 25 07/15/2022    BUN 5.8 (L) 07/15/2022    CREATININE 0.62 07/15/2022    CALCIUM 9.0 07/15/2022    EGFRNONAA >60 07/15/2022     ABG  Recent Labs   Lab 07/15/22  2001   PH 7.333*   PO2 72*   PCO2 45.2*   HCO3 24.0   BE -2     Significant Imaging:  I have reviewed all pertinent imaging within the past 24 hours.    No orders to display       Assessment/Plan:     Assessment    1. Overdose attempt, presumably with Norco 10 mg x 90 pills and Flexeril 10 mg x 60 pills  2. Tylenol toxicity  3. Ethanol toxicity, mild  4. Hypokalemia  5. PEC status  6. Medical comorbidities: chronic back pain, lumbar myelopathy, GERD, hypertension, anxiety/depression/bipolar, and tobacco use    Plan    -patient admitted to ICU for close monitoring after suicide attempt, currently hemodynamically stable, afebrile, on 2 L nasal cannula , wean as tolerated  -poison control contacted at outside ED; received 25 g of activated charcoal and 4 mg Narcan  -continue supportive care and monitoring for CNS/respiratory depression, fevers, flushing, urinary retention  -q.2 hours neurochecks  -p.r.n.  Narcan available as needed  -repeat Tylenol level, EKG, and ABG in the morning  -currently PEC'ed.  Consult Psychiatry in the morning  -replacing potassium    DVT Prophylaxis:  Lovenox 40  GI Prophylaxis:  Pepcid 20 b.i.d.    Greater than 30 minutes of critical care was time spent personally by me on the following activities: development of treatment plan with patient or surrogate and bedside caregivers, discussions with consultants, evaluation of patient's response to treatment, examination of patient, ordering and  performing treatments and interventions, ordering and review of laboratory studies, ordering and review of radiographic studies, pulse oximetry, re-evaluation of patient's condition.  This critical care time did not overlap with that of any other provider or involve time for any procedures.     Carlos Eduardo Kendall MD  ICU Resident, PGY-3  [facility]

## 2022-07-17 ENCOUNTER — HOSPITAL ENCOUNTER (INPATIENT)
Facility: HOSPITAL | Age: 52
LOS: 5 days | Discharge: HOME | DRG: 885 | End: 2022-07-22
Attending: PSYCHIATRY & NEUROLOGY | Admitting: PSYCHIATRY & NEUROLOGY
Payer: COMMERCIAL

## 2022-07-17 VITALS
DIASTOLIC BLOOD PRESSURE: 80 MMHG | OXYGEN SATURATION: 99 % | BODY MASS INDEX: 25.21 KG/M2 | WEIGHT: 147.69 LBS | HEIGHT: 64 IN | TEMPERATURE: 99 F | RESPIRATION RATE: 15 BRPM | SYSTOLIC BLOOD PRESSURE: 117 MMHG | HEART RATE: 91 BPM

## 2022-07-17 DIAGNOSIS — F32.A DEPRESSION: ICD-10-CM

## 2022-07-17 LAB
ALBUMIN SERPL-MCNC: 2.8 GM/DL (ref 3.5–5)
ALBUMIN/GLOB SERPL: 1.6 RATIO (ref 1.1–2)
ALP SERPL-CCNC: 50 UNIT/L (ref 40–150)
ALT SERPL-CCNC: 83 UNIT/L (ref 0–55)
AST SERPL-CCNC: 81 UNIT/L (ref 5–34)
BASOPHILS # BLD AUTO: 0.03 X10(3)/MCL (ref 0–0.2)
BASOPHILS NFR BLD AUTO: 0.3 %
BILIRUBIN DIRECT+TOT PNL SERPL-MCNC: 0.2 MG/DL
BUN SERPL-MCNC: 4.6 MG/DL (ref 9.8–20.1)
CALCIUM SERPL-MCNC: 8.2 MG/DL (ref 8.4–10.2)
CHLORIDE SERPL-SCNC: 106 MMOL/L (ref 98–107)
CO2 SERPL-SCNC: 26 MMOL/L (ref 22–29)
CREAT SERPL-MCNC: 0.5 MG/DL (ref 0.55–1.02)
EOSINOPHIL # BLD AUTO: 0.21 X10(3)/MCL (ref 0–0.9)
EOSINOPHIL NFR BLD AUTO: 2.4 %
ERYTHROCYTE [DISTWIDTH] IN BLOOD BY AUTOMATED COUNT: 13.4 % (ref 11.5–17)
GLOBULIN SER-MCNC: 1.8 GM/DL (ref 2.4–3.5)
GLUCOSE SERPL-MCNC: 91 MG/DL (ref 74–100)
HCT VFR BLD AUTO: 33.1 % (ref 37–47)
HGB BLD-MCNC: 10.7 GM/DL (ref 12–16)
IMM GRANULOCYTES # BLD AUTO: 0.02 X10(3)/MCL (ref 0–0.04)
IMM GRANULOCYTES NFR BLD AUTO: 0.2 %
LYMPHOCYTES # BLD AUTO: 3.02 X10(3)/MCL (ref 0.6–4.6)
LYMPHOCYTES NFR BLD AUTO: 35.1 %
MAGNESIUM SERPL-MCNC: 2 MG/DL (ref 1.6–2.6)
MCH RBC QN AUTO: 30.1 PG (ref 27–31)
MCHC RBC AUTO-ENTMCNC: 32.3 MG/DL (ref 33–36)
MCV RBC AUTO: 93.2 FL (ref 80–94)
MONOCYTES # BLD AUTO: 0.36 X10(3)/MCL (ref 0.1–1.3)
MONOCYTES NFR BLD AUTO: 4.2 %
NEUTROPHILS # BLD AUTO: 5 X10(3)/MCL (ref 2.1–9.2)
NEUTROPHILS NFR BLD AUTO: 57.8 %
NRBC BLD AUTO-RTO: 0 %
PHOSPHATE SERPL-MCNC: 3.3 MG/DL (ref 2.3–4.7)
PLATELET # BLD AUTO: 209 X10(3)/MCL (ref 130–400)
PMV BLD AUTO: 9.9 FL (ref 7.4–10.4)
POTASSIUM SERPL-SCNC: 3.3 MMOL/L (ref 3.5–5.1)
PROT SERPL-MCNC: 4.6 GM/DL (ref 6.4–8.3)
RBC # BLD AUTO: 3.55 X10(6)/MCL (ref 4.2–5.4)
SODIUM SERPL-SCNC: 141 MMOL/L (ref 136–145)
WBC # SPEC AUTO: 8.6 X10(3)/MCL (ref 4.5–11.5)

## 2022-07-17 PROCEDURE — 25000003 PHARM REV CODE 250: Performed by: INTERNAL MEDICINE

## 2022-07-17 PROCEDURE — 85025 COMPLETE CBC W/AUTO DIFF WBC: CPT | Performed by: STUDENT IN AN ORGANIZED HEALTH CARE EDUCATION/TRAINING PROGRAM

## 2022-07-17 PROCEDURE — 80053 COMPREHEN METABOLIC PANEL: CPT | Performed by: STUDENT IN AN ORGANIZED HEALTH CARE EDUCATION/TRAINING PROGRAM

## 2022-07-17 PROCEDURE — 25000003 PHARM REV CODE 250: Performed by: NURSE PRACTITIONER

## 2022-07-17 PROCEDURE — 11400000 HC PSYCH PRIVATE ROOM

## 2022-07-17 PROCEDURE — 83735 ASSAY OF MAGNESIUM: CPT | Performed by: STUDENT IN AN ORGANIZED HEALTH CARE EDUCATION/TRAINING PROGRAM

## 2022-07-17 PROCEDURE — 84100 ASSAY OF PHOSPHORUS: CPT | Performed by: STUDENT IN AN ORGANIZED HEALTH CARE EDUCATION/TRAINING PROGRAM

## 2022-07-17 PROCEDURE — 25000003 PHARM REV CODE 250: Performed by: PSYCHIATRY & NEUROLOGY

## 2022-07-17 PROCEDURE — 36415 COLL VENOUS BLD VENIPUNCTURE: CPT | Performed by: STUDENT IN AN ORGANIZED HEALTH CARE EDUCATION/TRAINING PROGRAM

## 2022-07-17 PROCEDURE — 63600175 PHARM REV CODE 636 W HCPCS: Performed by: STUDENT IN AN ORGANIZED HEALTH CARE EDUCATION/TRAINING PROGRAM

## 2022-07-17 RX ORDER — MAG HYDROX/ALUMINUM HYD/SIMETH 200-200-20
30 SUSPENSION, ORAL (FINAL DOSE FORM) ORAL EVERY 6 HOURS PRN
Status: DISCONTINUED | OUTPATIENT
Start: 2022-07-17 | End: 2022-07-22 | Stop reason: HOSPADM

## 2022-07-17 RX ORDER — POTASSIUM CHLORIDE 20 MEQ/1
20 TABLET, EXTENDED RELEASE ORAL 3 TIMES DAILY
Status: DISCONTINUED | OUTPATIENT
Start: 2022-07-17 | End: 2022-07-17 | Stop reason: HOSPADM

## 2022-07-17 RX ORDER — OXCARBAZEPINE 150 MG/1
150 TABLET, FILM COATED ORAL 2 TIMES DAILY
Start: 2022-07-17 | End: 2022-07-21

## 2022-07-17 RX ORDER — OLANZAPINE 5 MG/1
5 TABLET ORAL NIGHTLY
Status: DISCONTINUED | OUTPATIENT
Start: 2022-07-17 | End: 2022-07-18

## 2022-07-17 RX ORDER — HYDROXYZINE HYDROCHLORIDE 50 MG/1
50 TABLET, FILM COATED ORAL EVERY 4 HOURS PRN
Status: DISCONTINUED | OUTPATIENT
Start: 2022-07-17 | End: 2022-07-22 | Stop reason: HOSPADM

## 2022-07-17 RX ORDER — ACETAMINOPHEN 325 MG/1
650 TABLET ORAL EVERY 6 HOURS PRN
Status: DISCONTINUED | OUTPATIENT
Start: 2022-07-17 | End: 2022-07-22 | Stop reason: HOSPADM

## 2022-07-17 RX ORDER — POTASSIUM CHLORIDE 20 MEQ/1
20 TABLET, EXTENDED RELEASE ORAL 3 TIMES DAILY
Qty: 6 TABLET | Refills: 0 | Status: ON HOLD
Start: 2022-07-17 | End: 2022-07-21

## 2022-07-17 RX ORDER — POTASSIUM CHLORIDE 20 MEQ/1
20 TABLET, EXTENDED RELEASE ORAL 3 TIMES DAILY
Status: DISCONTINUED | OUTPATIENT
Start: 2022-07-17 | End: 2022-07-17

## 2022-07-17 RX ORDER — TRAZODONE HYDROCHLORIDE 100 MG/1
100 TABLET ORAL NIGHTLY PRN
Status: DISCONTINUED | OUTPATIENT
Start: 2022-07-17 | End: 2022-07-22 | Stop reason: HOSPADM

## 2022-07-17 RX ORDER — DIPHENHYDRAMINE HCL 50 MG
50 CAPSULE ORAL EVERY 4 HOURS PRN
Status: DISCONTINUED | OUTPATIENT
Start: 2022-07-17 | End: 2022-07-22 | Stop reason: HOSPADM

## 2022-07-17 RX ORDER — GABAPENTIN 300 MG/1
300 CAPSULE ORAL NIGHTLY
Status: DISCONTINUED | OUTPATIENT
Start: 2022-07-17 | End: 2022-07-18

## 2022-07-17 RX ORDER — OXYMETAZOLINE HCL 0.05 %
2 SPRAY, NON-AEROSOL (ML) NASAL 2 TIMES DAILY PRN
Status: ON HOLD
Start: 2022-07-17 | End: 2022-07-21

## 2022-07-17 RX ORDER — IBUPROFEN 200 MG
1 TABLET ORAL DAILY
Status: DISCONTINUED | OUTPATIENT
Start: 2022-07-18 | End: 2022-07-22 | Stop reason: HOSPADM

## 2022-07-17 RX ORDER — HALOPERIDOL 5 MG/ML
10 INJECTION INTRAMUSCULAR EVERY 4 HOURS PRN
Status: DISCONTINUED | OUTPATIENT
Start: 2022-07-17 | End: 2022-07-22 | Stop reason: HOSPADM

## 2022-07-17 RX ORDER — ADHESIVE BANDAGE
30 BANDAGE TOPICAL DAILY PRN
Status: DISCONTINUED | OUTPATIENT
Start: 2022-07-17 | End: 2022-07-22 | Stop reason: HOSPADM

## 2022-07-17 RX ORDER — DIPHENHYDRAMINE HYDROCHLORIDE 50 MG/ML
50 INJECTION INTRAMUSCULAR; INTRAVENOUS EVERY 4 HOURS PRN
Status: DISCONTINUED | OUTPATIENT
Start: 2022-07-17 | End: 2022-07-22 | Stop reason: HOSPADM

## 2022-07-17 RX ORDER — LORAZEPAM 2 MG/ML
2 INJECTION INTRAMUSCULAR EVERY 4 HOURS PRN
Status: DISCONTINUED | OUTPATIENT
Start: 2022-07-17 | End: 2022-07-22 | Stop reason: HOSPADM

## 2022-07-17 RX ORDER — SERTRALINE HYDROCHLORIDE 50 MG/1
100 TABLET, FILM COATED ORAL DAILY
Status: DISCONTINUED | OUTPATIENT
Start: 2022-07-18 | End: 2022-07-22 | Stop reason: HOSPADM

## 2022-07-17 RX ORDER — HALOPERIDOL 10 MG/1
10 TABLET ORAL EVERY 4 HOURS PRN
Status: DISCONTINUED | OUTPATIENT
Start: 2022-07-17 | End: 2022-07-22 | Stop reason: HOSPADM

## 2022-07-17 RX ORDER — OXCARBAZEPINE 150 MG/1
150 TABLET, FILM COATED ORAL 2 TIMES DAILY
Status: DISCONTINUED | OUTPATIENT
Start: 2022-07-17 | End: 2022-07-18

## 2022-07-17 RX ORDER — OLANZAPINE 5 MG/1
5 TABLET ORAL NIGHTLY
Start: 2022-07-17 | End: 2022-07-21

## 2022-07-17 RX ORDER — POTASSIUM CHLORIDE 20 MEQ/1
20 TABLET, EXTENDED RELEASE ORAL 2 TIMES DAILY
Status: DISCONTINUED | OUTPATIENT
Start: 2022-07-17 | End: 2022-07-17

## 2022-07-17 RX ORDER — LORAZEPAM 1 MG/1
2 TABLET ORAL EVERY 4 HOURS PRN
Status: DISCONTINUED | OUTPATIENT
Start: 2022-07-17 | End: 2022-07-22 | Stop reason: HOSPADM

## 2022-07-17 RX ADMIN — GABAPENTIN 300 MG: 300 CAPSULE ORAL at 08:07

## 2022-07-17 RX ADMIN — TRAZODONE HYDROCHLORIDE 100 MG: 100 TABLET ORAL at 08:07

## 2022-07-17 RX ADMIN — OLANZAPINE 5 MG: 5 TABLET, FILM COATED ORAL at 08:07

## 2022-07-17 RX ADMIN — PANTOPRAZOLE SODIUM 40 MG: 40 TABLET, DELAYED RELEASE ORAL at 09:07

## 2022-07-17 RX ADMIN — Medication 2 SPRAY: at 09:07

## 2022-07-17 RX ADMIN — ENOXAPARIN SODIUM 40 MG: 100 INJECTION SUBCUTANEOUS at 05:07

## 2022-07-17 RX ADMIN — OXCARBAZEPINE 150 MG: 150 TABLET, FILM COATED ORAL at 09:07

## 2022-07-17 RX ADMIN — POTASSIUM CHLORIDE 20 MEQ: 20 TABLET, EXTENDED RELEASE ORAL at 04:07

## 2022-07-17 RX ADMIN — OXCARBAZEPINE 150 MG: 150 TABLET, FILM COATED ORAL at 08:07

## 2022-07-17 RX ADMIN — SERTRALINE HYDROCHLORIDE 100 MG: 50 TABLET ORAL at 09:07

## 2022-07-17 NOTE — CONSULTS
"[facility]  Psychiatry  Consult Note    Patient Name: Yany Yuan  MRN: [id number]   Code Status: Full Code  Admission Date: 7/15/2022  Hospital Length of Stay: 1 days  Attending Physician: Kalani Potter MD  Primary Care Provider: Primary Doctor No    Current Legal Status: {Select Legal Status:01039}    Patient information was obtained from {info source:58309::"ER records"}.   Consults  Subjective:     Principal Problem:Intentional drug overdose    Chief Complaint:  ***     HPI: No notes on file    Hospital Course: No notes on file    No new subjective & objective note has been filed under this hospital service since the last note was generated.    Assessment/Plan:     No notes have been filed under this hospital service.  Service: Psychiatry       Total Time:  {IP PSYC TIMES:91007}     REE Flower   Psychiatry  [facility]  "

## 2022-07-17 NOTE — CONSULTS
"[facility]  Psychiatry  Consult Note    Patient Name: Yany Yuan  MRN: [id number]   Code Status: Full Code  Admission Date: 7/15/2022  Hospital Length of Stay: 1 days  Attending Physician: Kalani Potter MD  Primary Care Provider: Primary Doctor No    Current Legal Status: [facility]  Patient information was obtained from ER records and primary team.   Inpatient consult to Psychiatry  Consult performed by: REE Flower  Consult ordered by: Donna Kendall MD        Subjective:     Principal Problem:Intentional drug overdose    Chief Complaint: " I was dating this gianna for 7 years, he broke up with me".     HPI : Patient is a 51 year old white female admitted to ICU following intentional overdose of Norco and Flexeril tablets.  I met with Ms Yuan this afternoon as she tearfully describes the incident that led to her current hospitalization. She admits to ingesting some of her Norco and Flexeril tablet because she was recently evicted from her residence. When asked about the quantity, she responded " I couldn't tell how many".  Ms Yuan also  describes how her boyfriend of 7 years recently broke up with her and even "more hurtful, he took the Jeep he had given me back". Patient relates how she was recently evicted by her landlord because "I did not pay the rent, I went to work, came back home and got locked out, now  I have no where to go and no family".  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 She admits to recent impulsive spending stating  " I was buying a lot of stuff I didn't really need". Patient continues to endorse active suicidal ideations and she is reluctant to contract for safety after disposition. She also suggested that she may be leaving with her sister who is on her way to [place]. Patient denies auditory or visual hallucinations. Delusion and paranoia are absent. She endorses ongoing " "anxiety, insomnia hopelessness and recent impulsive behavior.     Past Psychiatric History : Bipolar Depression  2021, Major depressive disorder and Generalized Anxiety. Significant for past hospitalization  at [facility]  1 year ago for Manic depression. Patient was prescribed Lamictal and Effexor, but had stopped taken medication after 6 months because she ran out these medications.     Past Medical History: Chronic back pain, lumbar myelopathy, GERD, hypertension,   Social History ; Born and raised in [place],  with 1 son > lives out of state. She has 4 living sibling, but admits" I don't talk to them" . She also confirms "my sister is coming to see about me".   Hospital Course: Currently on suicide precautions. Will require in patient psychiatric placement for stabilization.          Patient History           Medical as of 7/16/2022    Past Medical History: Patient provided no pertinent medical history.           Surgical as of 7/16/2022    Past Surgical History: Patient provided no pertinent surgical history.           Family as of 7/16/2022    None           Tobacco Use as of 7/16/2022     Smoking Status Smoking Start Date Smoking Quit Date Packs/Day Years Used    Current Some Day Smoker -- -- -- --    Types Comments Smokeless Tobacco Status Smokeless Tobacco Quit Date Source    -- -- Never Used -- Provider            Alcohol Use as of 7/16/2022     Alcohol Use Drinks/Week Alcohol/Week Comments Source    --   -- -- Provider            Drug Use as of 7/16/2022     Drug Use Types Frequency Comments Source    -- -- -- -- Provider            Sexual Activity as of 7/16/2022     Sexually Active Birth Control Partners Comments Source    -- -- -- -- Provider            Activities of Daily Living as of 7/16/2022    None           Social Documentation as of 7/16/2022    None           Occupational as of 7/16/2022    None           Socioeconomic as of 7/16/2022     Marital Status Spouse " Name Number of Children Years Education Education Level Preferred Language Ethnicity Race Source    Unknown -- -- -- -- English Not  or /a White --            Pertinent History     Question Response Comments    Lives with -- --    Place in Birth Order -- --    Lives in -- --    Number of Siblings -- --    Raised by -- --    Legal Involvement -- --    Childhood Trauma -- --    Criminal History of -- --    Financial Status -- --    Highest Level of Education -- --    Does patient have access to a firearm? -- --     Service -- --    Primary Leisure Activity -- --    Spirituality -- --          Review of patient's allergies indicates:   Allergen Reactions    Tetracyclines Rash       No current facility-administered medications on file prior to encounter.     Current Outpatient Medications on File Prior to Encounter   Medication Sig    lamoTRIgine (LAMICTAL) 100 MG tablet Take 100 mg by mouth.    pantoprazole (PROTONIX) 40 MG tablet Take 40 mg by mouth.    traZODone (DESYREL) 100 MG tablet Take 100 mg by mouth.    venlafaxine 150 mg TR24 Take 150 mg by mouth.    amLODIPine (NORVASC) 10 MG tablet Take 10 mg by mouth once daily.    cyclobenzaprine (FLEXERIL) 10 MG tablet Take 10 mg by mouth nightly.    gabapentin (NEURONTIN) 300 MG capsule Take 300 mg by mouth nightly.    HYDROcodone-acetaminophen (NORCO)  mg per tablet Take 1 tablet by mouth 3 (three) times daily.    hydrOXYzine pamoate (VISTARIL) 25 MG Cap Take 25 mg by mouth every 6 (six) hours as needed.    meloxicam (MOBIC) 15 MG tablet Take 15 mg by mouth once daily.    sertraline (ZOLOFT) 100 MG tablet Take 100 mg by mouth once daily.     Psychotherapeutics (From admission, onward)            Start     Stop Route Frequency Ordered    07/17/22 0900  sertraline tablet 100 mg         -- Oral Daily 07/16/22 1523    07/16/22 2100  OLANZapine tablet 5 mg         -- Oral Nightly 07/16/22 1620        Review of Systems  "  Constitutional: Negative.    HENT: Negative.    Eyes: Negative.    Respiratory: Negative.    Gastrointestinal: Negative.    Endocrine: Negative.    Genitourinary: Negative.    Musculoskeletal: Positive for back pain.   Skin: Negative.    Psychiatric/Behavioral: Positive for dysphoric mood and suicidal ideas. The patient is nervous/anxious.      Objective:     Vital Signs (Most Recent):  Temp: 98.8 °F (37.1 °C) (07/16/22 2000)  Pulse: 90 (07/16/22 2000)  Resp: 20 (07/16/22 2000)  BP: 115/71 (07/16/22 2000)  SpO2: (!) 94 % (07/16/22 2000) Vital Signs (24h Range):  Temp:  [98.2 °F (36.8 °C)-98.9 °F (37.2 °C)] 98.8 °F (37.1 °C)  Pulse:  [] 90  Resp:  [9-25] 20  SpO2:  [91 %-98 %] 94 %  BP: ()/(68-97) 115/71     Height: 5' 4" (162.6 cm)  Weight: 67 kg (147 lb 11.3 oz)  Body mass index is 25.35 kg/m².      Intake/Output Summary (Last 24 hours) at 7/16/2022 2249  Last data filed at 7/16/2022 1200  Gross per 24 hour   Intake --   Output 1950 ml   Net -1950 ml       Physical Exam  Psychiatric:         Attention and Perception: Perception normal. She is inattentive.         Mood and Affect: Mood is anxious and depressed. Affect is tearful.         Speech: Speech is delayed.         Behavior: Behavior is withdrawn.         Thought Content: Thought content includes suicidal ideation.         Cognition and Memory: Cognition normal.         Judgment: Judgment is impulsive and inappropriate.          Significant Labs: UDS: Opiates+, Acetaminophen level on 07/16/22 : 17.4        Assessment/Plan:     Continue current suicide precaution : 1:1 sitter  Strongly recommend inpatient psychiatric placement for  medication management and therapeutic aftercare.  Will start on Mood stabilizer : Trileptal 150 mg PO BID  Add Olanzapine 5 mg PO for mood and Insomnia   Will consider antidepressant once stable   Psych will follow up for placement.   Active Diagnoses:    Diagnosis Date Noted POA    PRINCIPAL PROBLEM:  Intentional " drug overdose [T50.902A] 07/15/2022 Yes    Suicidal ideation [R45.851] 07/16/2022 Not Applicable    Major depression, recurrent, chronic [F33.9] 07/16/2022 Unknown    Adjustment disorder with mixed anxiety and depressed mood [F43.23] 07/16/2022 Unknown    Bipolar I disorder with depression, severe [F31.4] 07/16/2022 Yes      Problems Resolved During this Admission:          Geena Aevndano, CONNORP   Psychiatry  [facility]

## 2022-07-17 NOTE — PROGRESS NOTES
[facility] MEDICINE PROGRESS NOTE      Patient Name: Yany Yuan  MRN: [id number]  Admission Date: 7/15/2022  4:24 PM  Status: IP- Inpatient   Length of Stay: 2 days  Face-to-Face encounter date: 07/17/2022       CHIEF COMPLAINT  Drug Overdose (EMS reports that [facility] office went to evict the pt she took handful of Norco , and a hand ful Flexeril 10mg, 1mg narcan given IV per EMS)    HOSPITAL COURSE  51-year-old  female with past medical history of chronic back pain, lumbar myelopathy, GERD, hypertension, anxiety, depression, bipolar disorder and tobacco use who was admitted to the ICU on 7/16/22 as a transfer from [facility] after she presented there with overdose attempt. On 07/07/22 she filled 90 tablets of Norco 10 mg and 60 tablets of Flexeril 10 mg and both bottles were found to be empty.  Ingestion presumed to be around 3:00 p.m. on 7/15/22.   Patient reports her boyfriend broke up with her and she was recently evicted by her landlord, making her to attempt suicide.    She was very somnolent on presentation. Acetaminophen level was found to be elevated at 148.5 on admission but it trended down to < 17.4.  She was given 25 g of activated charcoal and Narcan with improvement in the mental status. Poison control center was contacted.   She was initially admitted to the ICU for close monitoring of mental status. She has remained hemodynamically stable, afebrile. Initially on 2 L nasal cannula but weaned to room air. Leonard catheter was placed on admission.   She did not require any vasopressor support or any mechanical ventilation. She was downgraded to the medical floor and transferred to hospital medicine service on 7/16/22.   Patient was PEC'd and subsequently CEC'd. She was seen by psychiatry.  Inpatient psychiatry facility placement recommended.    Today (07/17/2022): Patient denies any complaints today. Denies auditory or visual hallucinations. Sitter  at bedside.    OBJECTIVE    VITAL SIGNS: 24 HRS MIN & MAX LAST   Temp  Min: 98.2 °F (36.8 °C)  Max: 98.8 °F (37.1 °C) 98.4 °F (36.9 °C)   BP  Min: 88/58  Max: 136/83 116/74   Pulse  Min: 74  Max: 105  90   Resp  Min: 10  Max: 22 16   SpO2  Min: 90 %  Max: 97 % 95 %       LABS/MICROBIOLOGY/MEDICATIONS/DIAGNOSTICS  I have reviewed all pertinent lab results within the past 24 hours.    Recent Labs   Lab 07/15/22  1727 07/17/22  0215   WBC 6.3 8.6   RBC 4.16* 3.55*   HGB 12.8 10.7*   HCT 38.0 33.1*   MCV 91.3 93.2   MCH 30.8 30.1   MCHC 33.7 32.3*   RDW 12.9 13.4    209   MPV 9.7 9.9       Recent Labs   Lab 07/15/22  1715 07/15/22  1727 07/15/22  2001 07/16/22  0710 07/17/22  0215   NA  --  141  --  138 141   K  --  3.3*  --  3.6 3.3*   CO2  --  25  --  21* 26   BUN  --  5.8*  --  5.6* 4.6*   CREATININE  --  0.62  --  0.58 0.50*   CALCIUM  --  9.0  --  8.7 8.2*   PH 7.363  --  7.333*  --   --    MG  --   --   --  1.70 2.00   ALBUMIN  --  3.8  --  3.1* 2.8*   ALKPHOS  --  57  --  52 50   ALT  --  27  --  72* 83*   AST  --  40*  --  76* 81*   BILITOT  --  0.2  --  0.3 0.2       Recent Labs     07/15/22  1727 07/17/22  0215   WBC 6.3 8.6   RBC 4.16* 3.55*   HGB 12.8 10.7*   HCT 38.0 33.1*   MCV 91.3 93.2   MCH 30.8 30.1   MCHC 33.7 32.3*   RDW 12.9 13.4     Recent Labs     07/15/22  1813 07/16/22  0710 07/17/22  0215   CHLORIDE  --  106 106   CO2  --  21* 26   BUN  --  5.6* 4.6*   CREATININE  --  0.58 0.50*   GLUCOSE  --  69* 91   CALCIUM  --  8.7 8.2*   ALBUMIN  --  3.1* 2.8*   ALKPHOS  --  52 50   ALT  --  72* 83*   AST  --  76* 81*   INR 0.95*  --   --        Microbiology Results (last 7 days)     ** No results found for the last 168 hours. **           X-Ray Abdomen AP 1 View  XR Abdomen KUB 1 View     Indication: Abdominal pain     Technique: AP view of the abdomen     Comparison: CT abdomen and pelvis 7/23/2016     Findings: No obvious free air. No dilated loops of bowel. Air is seen  distally. No suspicious  masses or calcifications. The osseous  structures are unremarkable.     Impression: Nonspecific nonobstructive bowel gas pattern.        Electronically Signed By: Brandon Vazquez MD  Date/Time Signed: 12/12/2016 08:29  X-Ray Chest PA Lateral With Lordotic Vie  XR Chest 2 Views     Indication: Chest pain     Technique: PA and lateral views of the chest.     Comparison: None.     Findings: The cardiac silhouette is within normal limits. The  mediastinum is in the midline. No consolidation or edema. The  costophrenic angles are sharp. No pneumothorax. The bones and soft  tissues are unremarkable.     Impression: No radiographic evidence of an acute cardiopulmonary  process.        Electronically Signed By: Brandon Vazquez MD  Date/Time Signed: 12/12/2016 08:26        Scheduled Med:   enoxaparin  40 mg Subcutaneous Daily    famotidine (PF)  20 mg Intravenous Q12H    gabapentin  300 mg Oral Nightly    OLANZapine  5 mg Oral QHS    OXcarbazepine  150 mg Oral BID    oxymetazoline  2 spray Each Nostril BID    pantoprazole  40 mg Oral Daily    sertraline  100 mg Oral Daily        Continuous Infusions:   lactated ringers 125 mL/hr at 07/16/22 0338        PRN Meds:  hydrALAZINE, naloxone, ondansetron, sodium chloride 0.9%, sodium chloride 0.9%       PHYSICAL EXAM  Constitutional: Appears well-developed, well-nourished and appears stated age. No acute distress.   Head/Eyes/Ears/Nose/Mouth/Throat: Normocephalic, atraumatic. PERRLA, EOM intact. Conjunctive clear, sclera white. Pinna no masses, lesions, tenderness, drainage. Pharynx pink.  Neck: No JVD present. Normal alignment and mobility.  Cardiovascular: Regular rate and rhythm, S1 / S2, no systolic or diastolic murmur, no rubs or gallops.  Pulmonary/Chest: Effort normal. No respiratory distress. Breath sounds normal. No rales, rhonchi, rubs or wheezes.    Abdominal: Flat, soft, non-tender, non-distended, no rebound tenderness or guarding, no palpable masses, no  hepatosplenomegaly, normal bowel sounds in all four quadrants, no bruits.  Musculoskeletal: Extremities symmetric, no tenderness, weakness, or swelling. Maintains flexion against resistance.   Extremities:  Nails: No clubbing, cyanosis, petechiae or nodes. 2+ bilateral pedal and radial pulses with absence of bilateral lower ext edema.  Neurological: Alert and oriented to person, place, and time. Cooperative. Speech clear, appropriate. No dysarthria. Cranial nerves II-XII grossly intact. No focal deficits.  Psychiatric: No depression, anxiety or agitation. Normal affect, no hallucinations or suicidal ideations, no pressured speech.  Skin: Skin is warm and dry. Intact, no clubbing, decubitis, lesions, rashes or erythema.  : Leonard catheter with clear urine in bag.     ASSESSMENT  Suicide attempt with intentional Flexeril and Norco overdose  Acute toxic metabolic encephalopathy: resolved  Bipolar disorder  Chronic depression and anxiety  Chronic back pain and lumbar myelopathy on chronic opioids  Essential hypertension  Hypokalemia  GERD  Chronic tobacco abuse: cessation counseling done, 3 mins spent  Anemia: suspect dilutional. No obvious bleeding  Acute mild transaminitis    PLAN  CEC in place, sitter at bedside  DC IV fluids  Holding Norvasc due to borderline hypotension  Continue Trileptal, Olanzapine, Sertraline, Protonix and Gabapentin  DC Leonard catheter  Replace potassium  Patient is medically stable for transfer to inpatient psychiatry facility  Consult case management for placement    DVT Prophylaxis: Lovenox      Patient condition:  Stable    Anticipated discharge and disposition: Inpatient psychiatry facility placement  __________________________________________________________________________    All diagnosis and differential diagnosis have been reviewed; assessment and plan have been documented. I have personally reviewed the test results that are presently available; I have reviewed the patient's  medication list. I have reviewed the consulting providers' recommendations. I have reviewed or attempted to review medical records based upon their availability.  All of the patient's questions have been addressed and answered. Patient is agreeable to the above stated plan. I will continue to monitor closely and make adjustment to medical management as needed.    This document was created with the assistance of a voice recognition software. There may be transcription errors as a result of using this technology, however minimal. Effort has been made to ensure accuracy of transcription but any obvious errors or omissions should be clarified with the author of this document.        Kaley Reyna MD   [facility] Medicine  07/17/2022

## 2022-07-17 NOTE — DISCHARGE SUMMARY
[facility]    Hospital Medicine Discharge Summary        Patient Name: Yany Yuan  MRN: [id number]  Admit Date: 7/15/2022  Discharge Date and Time: 7/17/2022  6:48 PM  Status: IP- Inpatient   Hospital Length of Stay: 2 days  Admitting Physician: Kalani Potter MD  Discharging Physician: Abe Reyna MD.  Primary Care Physician: Primary Doctor No  Principal Problem: Intentional drug overdose    Consults (From admission, onward)        Status Ordering Provider     Inpatient consult to Social Work/Case Management  Once        Provider:  (Not yet assigned)    Acknowledged ABE REYNA     Inpatient consult to Social Work/Case Management  Once        Provider:  (Not yet assigned)    Acknowledged RACHEL NATION     Inpatient consult to Social Work/Case Management  Once        Provider:  (Not yet assigned)    Acknowledged RACHEL NATION     Inpatient consult to Psychiatry  Once        Provider:  (Not yet assigned)    Completed RACHEL NATION          Discharge Diagnoses:  Suicide attempt with intentional Flexeril and Norco overdose  Acute toxic metabolic encephalopathy: resolved  Bipolar disorder  Chronic depression and anxiety  Chronic back pain and lumbar myelopathy on chronic opioids  Essential hypertension  Hypokalemia  GERD  Chronic tobacco abuse: cessation counseling done, 3 mins spent  Anemia: suspect dilutional. No obvious bleeding  Acute mild transaminitis    Hospital Course:   51-year-old  female with past medical history of chronic back pain, lumbar myelopathy, GERD, hypertension, anxiety, depression, bipolar disorder and tobacco use who was admitted to the ICU on 7/16/22 as a transfer from [facility] after she presented there with overdose attempt. On 07/07/22 she filled 90 tablets of Norco 10 mg and 60 tablets of Flexeril 10 mg and both bottles were found to be empty.  Ingestion presumed to be around 3:00 p.m. on 7/15/22.   Patient reports her  boyfriend broke up with her and she was recently evicted by her landlord, making her to attempt suicide.    She was very somnolent on presentation. Acetaminophen level was found to be elevated at 148.5 on admission but it trended down to < 17.4.  She was given 25 g of activated charcoal and Narcan with improvement in the mental status. Poison control center was contacted.   She was initially admitted to the ICU for close monitoring of mental status. She has remained hemodynamically stable, afebrile. Initially on 2 L nasal cannula but weaned to room air. Leonard catheter was placed on admission.   She did not require any vasopressor support or any mechanical ventilation. She was downgraded to the medical floor and transferred to hospital medicine service on 7/16/22.   Patient was PEC'd and subsequently CEC'd. She was seen by psychiatry. Placed on Trileptal and Olanzapine. Inpatient psychiatry facility placement recommended. Patient is medically stable for transfer to inpatient psychiatry facility. She has been accepted at [facility]. Holding Norvasc due to borderline hypotension.      VITAL SIGNS: 24 HRS MIN & MAX LAST   Temp  Min: 98.4 °F (36.9 °C)  Max: 98.8 °F (37.1 °C) 98.6 °F (37 °C)   BP  Min: 88/58  Max: 127/82 117/80   Pulse  Min: 74  Max: 103  91   Resp  Min: 10  Max: 22 15   SpO2  Min: 90 %  Max: 99 % 99 %       Physical Exam:  Constitutional: Appears well-developed, well-nourished and appears stated age. No acute distress.   Head/Eyes/Ears/Nose/Mouth/Throat: Normocephalic, atraumatic. PERRLA, EOM intact. Conjunctive clear, sclera white. Pinna no masses, lesions, tenderness, drainage. Pharynx pink.  Neck: No JVD present. Normal alignment and mobility.  Cardiovascular: Regular rate and rhythm, S1 / S2, no systolic or diastolic murmur, no rubs or gallops.  Pulmonary/Chest: Effort normal. No respiratory distress. Breath sounds normal. No rales, rhonchi, rubs or wheezes.    Abdominal: Flat, soft, non-tender,  non-distended, no rebound tenderness or guarding, no palpable masses, no hepatosplenomegaly, normal bowel sounds in all four quadrants, no bruits.  Musculoskeletal: Extremities symmetric, no tenderness, weakness, or swelling. Maintains flexion against resistance.   Extremities:  Nails: No clubbing, cyanosis, petechiae or nodes. 2+ bilateral pedal and radial pulses with absence of bilateral lower ext edema.  Neurological: Alert and oriented to person, place, and time. Cooperative. Speech clear, appropriate. No dysarthria. Cranial nerves II-XII grossly intact. No focal deficits.  Psychiatric: No depression, anxiety or agitation. Normal affect, no hallucinations or suicidal ideations, no pressured speech.  Skin: Skin is warm and dry. Intact, no clubbing, decubitis, lesions, rashes or erythema.    Procedures Performed: * No surgery found *       Significant Diagnostic Studies: See Full reports for all details  Recent Labs   Lab 07/15/22  1727 07/17/22  0215   WBC 6.3 8.6   RBC 4.16* 3.55*   HGB 12.8 10.7*   HCT 38.0 33.1*   MCV 91.3 93.2   MCH 30.8 30.1   MCHC 33.7 32.3*   RDW 12.9 13.4    209   MPV 9.7 9.9       Recent Labs   Lab 07/15/22  1715 07/15/22  1727 07/15/22  2001 07/16/22  0710 07/17/22  0215   NA  --  141  --  138 141   K  --  3.3*  --  3.6 3.3*   CO2  --  25  --  21* 26   BUN  --  5.8*  --  5.6* 4.6*   CREATININE  --  0.62  --  0.58 0.50*   CALCIUM  --  9.0  --  8.7 8.2*   PH 7.363  --  7.333*  --   --    MG  --   --   --  1.70 2.00   ALBUMIN  --  3.8  --  3.1* 2.8*   ALKPHOS  --  57  --  52 50   ALT  --  27  --  72* 83*   AST  --  40*  --  76* 81*   BILITOT  --  0.2  --  0.3 0.2       Recent Labs     07/15/22  1727 07/17/22  0215   WBC 6.3 8.6   RBC 4.16* 3.55*   HGB 12.8 10.7*   HCT 38.0 33.1*   MCV 91.3 93.2   MCH 30.8 30.1   MCHC 33.7 32.3*   RDW 12.9 13.4     Recent Labs     07/15/22  1813 07/16/22  0710 07/17/22  0215   CHLORIDE  --  106 106   CO2  --  21* 26   BUN  --  5.6* 4.6*   CREATININE   --  0.58 0.50*   GLUCOSE  --  69* 91   CALCIUM  --  8.7 8.2*   ALBUMIN  --  3.1* 2.8*   ALKPHOS  --  52 50   ALT  --  72* 83*   AST  --  76* 81*   INR 0.95*  --   --        Microbiology Results (last 7 days)     ** No results found for the last 168 hours. **           X-Ray Abdomen AP 1 View  XR Abdomen KUB 1 View     Indication: Abdominal pain     Technique: AP view of the abdomen     Comparison: CT abdomen and pelvis 7/23/2016     Findings: No obvious free air. No dilated loops of bowel. Air is seen  distally. No suspicious masses or calcifications. The osseous  structures are unremarkable.     Impression: Nonspecific nonobstructive bowel gas pattern.        Electronically Signed By: Brandon Vazquez MD  Date/Time Signed: 12/12/2016 08:29  X-Ray Chest PA Lateral With Lordotic Vie  XR Chest 2 Views     Indication: Chest pain     Technique: PA and lateral views of the chest.     Comparison: None.     Findings: The cardiac silhouette is within normal limits. The  mediastinum is in the midline. No consolidation or edema. The  costophrenic angles are sharp. No pneumothorax. The bones and soft  tissues are unremarkable.     Impression: No radiographic evidence of an acute cardiopulmonary  process.        Electronically Signed By: Brandon Vazquez MD  Date/Time Signed: 12/12/2016 08:26        Recent Results (from the past 24 hour(s))   Phosphorus    Collection Time: 07/17/22  2:15 AM   Result Value Ref Range    Phosphorus Level 3.3 2.3 - 4.7 mg/dL   Magnesium    Collection Time: 07/17/22  2:15 AM   Result Value Ref Range    Magnesium Level 2.00 1.60 - 2.60 mg/dL   Comprehensive Metabolic Panel    Collection Time: 07/17/22  2:15 AM   Result Value Ref Range    Sodium Level 141 136 - 145 mmol/L    Potassium Level 3.3 (L) 3.5 - 5.1 mmol/L    Chloride 106 98 - 107 mmol/L    Carbon Dioxide 26 22 - 29 mmol/L    Glucose Level 91 74 - 100 mg/dL    Blood Urea Nitrogen 4.6 (L) 9.8 - 20.1 mg/dL    Creatinine 0.50 (L) 0.55 - 1.02 mg/dL     Calcium Level Total 8.2 (L) 8.4 - 10.2 mg/dL    Protein Total 4.6 (L) 6.4 - 8.3 gm/dL    Albumin Level 2.8 (L) 3.5 - 5.0 gm/dL    Globulin 1.8 (L) 2.4 - 3.5 gm/dL    Albumin/Globulin Ratio 1.6 1.1 - 2.0 ratio    Bilirubin Total 0.2 <=1.5 mg/dL    Alkaline Phosphatase 50 40 - 150 unit/L    Alanine Aminotransferase 83 (H) 0 - 55 unit/L    Aspartate Aminotransferase 81 (H) 5 - 34 unit/L    Estimated GFR-Non  >60 mls/min/1.73/m2   CBC with Differential    Collection Time: 07/17/22  2:15 AM   Result Value Ref Range    WBC 8.6 4.5 - 11.5 x10(3)/mcL    RBC 3.55 (L) 4.20 - 5.40 x10(6)/mcL    Hgb 10.7 (L) 12.0 - 16.0 gm/dL    Hct 33.1 (L) 37.0 - 47.0 %    MCV 93.2 80.0 - 94.0 fL    MCH 30.1 27.0 - 31.0 pg    MCHC 32.3 (L) 33.0 - 36.0 mg/dL    RDW 13.4 11.5 - 17.0 %    Platelet 209 130 - 400 x10(3)/mcL    MPV 9.9 7.4 - 10.4 fL    Neut % 57.8 %    Lymph % 35.1 %    Mono % 4.2 %    Eos % 2.4 %    Basophil % 0.3 %    Lymph # 3.02 0.6 - 4.6 x10(3)/mcL    Neut # 5.0 2.1 - 9.2 x10(3)/mcL    Mono # 0.36 0.1 - 1.3 x10(3)/mcL    Eos # 0.21 0 - 0.9 x10(3)/mcL    Baso # 0.03 0 - 0.2 x10(3)/mcL    IG# 0.02 0 - 0.04 x10(3)/mcL    IG% 0.2 %    NRBC% 0.0 %       Microbiology Results (last 7 days)     ** No results found for the last 168 hours. **           No results found.- pulls last radiology orders       Explained in detail to the patient about the discharge plan, medications, and follow-up visits. Pt understands and agrees with the treatment plan    Discharged Condition: stable  Disposition: Psychiatric Hospital   Discharge Medications:      Medication List      Start taking these medications    OLANZapine 5 MG tablet  Commonly known as: ZyPREXA  Take 1 tablet (5 mg total) by mouth every evening.     OXcarbazepine 150 MG Tab  Commonly known as: TRILEPTAL  Take 1 tablet (150 mg total) by mouth 2 (two) times daily.     oxymetazoline 0.05 % nasal spray  Commonly known as: AFRIN  2 sprays by Nasal route 2 (two) times  daily as needed for Congestion.     potassium chloride SA 20 MEQ tablet  Commonly known as: K-DUR,KLOR-CON  Take 1 tablet (20 mEq total) by mouth 3 (three) times daily. for 6 doses        Continue taking these medications    pantoprazole 40 MG tablet  Commonly known as: PROTONIX     sertraline 100 MG tablet  Commonly known as: ZOLOFT        Stop taking these medications    amLODIPine 10 MG tablet  Commonly known as: NORVASC     cyclobenzaprine 10 MG tablet  Commonly known as: FLEXERIL     gabapentin 300 MG capsule  Commonly known as: NEURONTIN     HYDROcodone-acetaminophen  mg per tablet  Commonly known as: NORCO     hydrOXYzine pamoate 25 MG Cap  Commonly known as: VISTARIL     lamoTRIgine 100 MG tablet  Commonly known as: LAMICTAL     meloxicam 15 MG tablet  Commonly known as: MOBIC     traZODone 100 MG tablet  Commonly known as: DESYREL     venlafaxine 150 mg Tr24           Where to Get Your Medications      Information about where to get these medications is not yet available    Ask your nurse or doctor about these medications  · OLANZapine 5 MG tablet  · OXcarbazepine 150 MG Tab  · oxymetazoline 0.05 % nasal spray  · potassium chloride SA 20 MEQ tablet        Follow up:    Discharge Procedure Orders   Diet Adult Regular     Activity as tolerated       For further questions, please contact hospitalist office    Discharge time: 37 minutes      Kaley Reyna MD   [facility] Medicine  07/17/2022

## 2022-07-17 NOTE — PLAN OF CARE
Pt is under a pec and cec . The icu nurse contacted me stating pt has had a psych consult and the physician states pt is now stable and ready for placement. This is documented in clinical note. Referral called  in to [facility] to begin looking for placement. I spoke with Harper.

## 2022-07-18 LAB
ALBUMIN SERPL-MCNC: 3 GM/DL (ref 3.5–5)
ALBUMIN/GLOB SERPL: 1.2 RATIO (ref 1.1–2)
ALP SERPL-CCNC: 55 UNIT/L (ref 40–150)
ALT SERPL-CCNC: 95 UNIT/L (ref 0–55)
AST SERPL-CCNC: 73 UNIT/L (ref 5–34)
BASOPHILS # BLD AUTO: 0.02 X10(3)/MCL (ref 0–0.2)
BASOPHILS NFR BLD AUTO: 0.3 %
BILIRUBIN DIRECT+TOT PNL SERPL-MCNC: 0.2 MG/DL
BUN SERPL-MCNC: 5.2 MG/DL (ref 9.8–20.1)
CALCIUM SERPL-MCNC: 8.7 MG/DL (ref 8.4–10.2)
CHLORIDE SERPL-SCNC: 105 MMOL/L (ref 98–107)
CO2 SERPL-SCNC: 28 MMOL/L (ref 22–29)
CREAT SERPL-MCNC: 0.58 MG/DL (ref 0.55–1.02)
EOSINOPHIL # BLD AUTO: 0.2 X10(3)/MCL (ref 0–0.9)
EOSINOPHIL NFR BLD AUTO: 2.7 %
ERYTHROCYTE [DISTWIDTH] IN BLOOD BY AUTOMATED COUNT: 13.2 % (ref 11.5–17)
GLOBULIN SER-MCNC: 2.5 GM/DL (ref 2.4–3.5)
GLUCOSE SERPL-MCNC: 79 MG/DL (ref 74–100)
HCT VFR BLD AUTO: 36 % (ref 37–47)
HGB BLD-MCNC: 12.1 GM/DL (ref 12–16)
IMM GRANULOCYTES # BLD AUTO: 0.02 X10(3)/MCL (ref 0–0.04)
IMM GRANULOCYTES NFR BLD AUTO: 0.3 %
LYMPHOCYTES # BLD AUTO: 2.97 X10(3)/MCL (ref 0.6–4.6)
LYMPHOCYTES NFR BLD AUTO: 40.1 %
MCH RBC QN AUTO: 30.9 PG (ref 27–31)
MCHC RBC AUTO-ENTMCNC: 33.6 MG/DL (ref 33–36)
MCV RBC AUTO: 91.8 FL (ref 80–94)
MONOCYTES # BLD AUTO: 0.29 X10(3)/MCL (ref 0.1–1.3)
MONOCYTES NFR BLD AUTO: 3.9 %
NEUTROPHILS # BLD AUTO: 3.9 X10(3)/MCL (ref 2.1–9.2)
NEUTROPHILS NFR BLD AUTO: 52.7 %
NRBC BLD AUTO-RTO: 0 %
PLATELET # BLD AUTO: 255 X10(3)/MCL (ref 130–400)
PMV BLD AUTO: 10 FL (ref 7.4–10.4)
POTASSIUM SERPL-SCNC: 4 MMOL/L (ref 3.5–5.1)
PROT SERPL-MCNC: 5.5 GM/DL (ref 6.4–8.3)
RBC # BLD AUTO: 3.92 X10(6)/MCL (ref 4.2–5.4)
SODIUM SERPL-SCNC: 142 MMOL/L (ref 136–145)
T PALLIDUM AB SER QL: NONREACTIVE
TSH SERPL-ACNC: 0.88 UIU/ML (ref 0.35–4.94)
WBC # SPEC AUTO: 7.4 X10(3)/MCL (ref 4.5–11.5)

## 2022-07-18 PROCEDURE — 11400000 HC PSYCH PRIVATE ROOM

## 2022-07-18 PROCEDURE — 86780 TREPONEMA PALLIDUM: CPT | Performed by: PSYCHIATRY & NEUROLOGY

## 2022-07-18 PROCEDURE — 25000003 PHARM REV CODE 250: Performed by: PEDIATRICS

## 2022-07-18 PROCEDURE — 36415 COLL VENOUS BLD VENIPUNCTURE: CPT | Performed by: PSYCHIATRY & NEUROLOGY

## 2022-07-18 PROCEDURE — 84443 ASSAY THYROID STIM HORMONE: CPT | Performed by: PSYCHIATRY & NEUROLOGY

## 2022-07-18 PROCEDURE — 85025 COMPLETE CBC W/AUTO DIFF WBC: CPT | Performed by: PSYCHIATRY & NEUROLOGY

## 2022-07-18 PROCEDURE — 25000003 PHARM REV CODE 250: Performed by: PSYCHIATRY & NEUROLOGY

## 2022-07-18 PROCEDURE — S4991 NICOTINE PATCH NONLEGEND: HCPCS | Performed by: PSYCHIATRY & NEUROLOGY

## 2022-07-18 PROCEDURE — 80053 COMPREHEN METABOLIC PANEL: CPT | Performed by: PSYCHIATRY & NEUROLOGY

## 2022-07-18 RX ORDER — OXYMETAZOLINE HCL 0.05 %
2 SPRAY, NON-AEROSOL (ML) NASAL 2 TIMES DAILY PRN
Status: DISPENSED | OUTPATIENT
Start: 2022-07-18 | End: 2022-07-21

## 2022-07-18 RX ORDER — CYCLOBENZAPRINE HCL 10 MG
10 TABLET ORAL NIGHTLY
Status: DISCONTINUED | OUTPATIENT
Start: 2022-07-18 | End: 2022-07-22 | Stop reason: HOSPADM

## 2022-07-18 RX ORDER — AMLODIPINE BESYLATE 5 MG/1
10 TABLET ORAL DAILY
Status: DISCONTINUED | OUTPATIENT
Start: 2022-07-18 | End: 2022-07-22 | Stop reason: HOSPADM

## 2022-07-18 RX ORDER — PANTOPRAZOLE SODIUM 40 MG/1
40 TABLET, DELAYED RELEASE ORAL DAILY
Status: DISCONTINUED | OUTPATIENT
Start: 2022-07-18 | End: 2022-07-22 | Stop reason: HOSPADM

## 2022-07-18 RX ORDER — GABAPENTIN 300 MG/1
300 CAPSULE ORAL NIGHTLY
Status: DISCONTINUED | OUTPATIENT
Start: 2022-07-18 | End: 2022-07-22 | Stop reason: HOSPADM

## 2022-07-18 RX ADMIN — PANTOPRAZOLE SODIUM 40 MG: 40 TABLET, DELAYED RELEASE ORAL at 09:07

## 2022-07-18 RX ADMIN — OXCARBAZEPINE 150 MG: 150 TABLET, FILM COATED ORAL at 09:07

## 2022-07-18 RX ADMIN — SERTRALINE HYDROCHLORIDE 100 MG: 50 TABLET ORAL at 09:07

## 2022-07-18 RX ADMIN — NICOTINE 1 PATCH: 21 PATCH, EXTENDED RELEASE TRANSDERMAL at 09:07

## 2022-07-18 RX ADMIN — GABAPENTIN 300 MG: 300 CAPSULE ORAL at 08:07

## 2022-07-18 RX ADMIN — AMLODIPINE BESYLATE 10 MG: 5 TABLET ORAL at 09:07

## 2022-07-18 RX ADMIN — CYCLOBENZAPRINE 10 MG: 10 TABLET, FILM COATED ORAL at 08:07

## 2022-07-18 RX ADMIN — TRAZODONE HYDROCHLORIDE 100 MG: 100 TABLET ORAL at 08:07

## 2022-07-18 NOTE — PROGRESS NOTES
07/18/22 1400   [facility] Group Therapy   Group Name Therapeutic Recreation   Specific Interventions Skilled Activity Creative Expression   Participation Level Minimal   Participation Quality Restive   Insight/Motivation Limited   Affect/Mood Display Depressed   Cognition Oriented   Psychomotor WNL

## 2022-07-18 NOTE — H&P
[facility]  History & Physical    Subjective:      Chief Complaint/Reason for Admission: overdose on hydrocodone     Yany Yuan is a 51 y.o. female. Major depression with overdose on hydrocodone   History of chronic back pain with opiate usage     No past medical history on file.  No past surgical history on file.  No family history on file.  Social History     Tobacco Use    Smoking status: Current Some Day Smoker    Smokeless tobacco: Never Used       PTA Medications   Medication Sig    OLANZapine (ZYPREXA) 5 MG tablet Take 1 tablet (5 mg total) by mouth every evening.    OXcarbazepine (TRILEPTAL) 150 MG Tab Take 1 tablet (150 mg total) by mouth 2 (two) times daily.    oxymetazoline (AFRIN) 0.05 % nasal spray 2 sprays by Nasal route 2 (two) times daily as needed for Congestion.    pantoprazole (PROTONIX) 40 MG tablet Take 40 mg by mouth.    potassium chloride SA (K-DUR,KLOR-CON) 20 MEQ tablet Take 1 tablet (20 mEq total) by mouth 3 (three) times daily. for 6 doses    sertraline (ZOLOFT) 100 MG tablet Take 100 mg by mouth once daily.     Review of patient's allergies indicates:   Allergen Reactions    Tetracyclines Rash        ROS    Objective:      Vital Signs (Most Recent)  Temp: 98.1 °F (36.7 °C) (07/17/22 2025)  Pulse: 93 (07/17/22 2025)  Resp: 18 (07/17/22 2025)  BP: 133/78 (07/17/22 2025)  SpO2: 97 % (07/17/22 2025)    Vital Signs Range (Last 24H):  Temp:  [98.1 °F (36.7 °C)-98.6 °F (37 °C)]   Pulse:  [74-93]   Resp:  [10-22]   BP: (102-133)/(66-82)   SpO2:  [93 %-99 %]     Physical Exam  HENT:      Head: Normocephalic.      Right Ear: Tympanic membrane normal.      Left Ear: Tympanic membrane normal.      Nose: Nose normal.      Mouth/Throat:      Mouth: Mucous membranes are moist.   Eyes:      Extraocular Movements: Extraocular movements intact.      Pupils: Pupils are equal, round, and reactive to light.   Cardiovascular:      Rate and Rhythm: Normal  rate and regular rhythm.   Pulmonary:      Effort: Pulmonary effort is normal.   Abdominal:      General: Abdomen is flat.   Musculoskeletal:         General: Normal range of motion.   Skin:     General: Skin is warm.   Neurological:      General: No focal deficit present.      Mental Status: She is alert and oriented to person, place, and time.      Comments: Vision normal   Hearing normal   EOM intact   Face muscles normal  Facial sensation normal   Shrugs shoulders  Tongue midline            Data Review:    Recent Results (from the past 48 hour(s))   Comprehensive Metabolic Panel    Collection Time: 07/16/22  7:10 AM   Result Value Ref Range    Sodium Level 138 136 - 145 mmol/L    Potassium Level 3.6 3.5 - 5.1 mmol/L    Chloride 106 98 - 107 mmol/L    Carbon Dioxide 21 (L) 22 - 29 mmol/L    Glucose Level 69 (L) 74 - 100 mg/dL    Blood Urea Nitrogen 5.6 (L) 9.8 - 20.1 mg/dL    Creatinine 0.58 0.55 - 1.02 mg/dL    Calcium Level Total 8.7 8.4 - 10.2 mg/dL    Protein Total 5.2 (L) 6.4 - 8.3 gm/dL    Albumin Level 3.1 (L) 3.5 - 5.0 gm/dL    Globulin 2.1 (L) 2.4 - 3.5 gm/dL    Albumin/Globulin Ratio 1.5 1.1 - 2.0 ratio    Bilirubin Total 0.3 <=1.5 mg/dL    Alkaline Phosphatase 52 40 - 150 unit/L    Alanine Aminotransferase 72 (H) 0 - 55 unit/L    Aspartate Aminotransferase 76 (H) 5 - 34 unit/L    Estimated GFR-Non  >60 mls/min/1.73/m2   Magnesium    Collection Time: 07/16/22  7:10 AM   Result Value Ref Range    Magnesium Level 1.70 1.60 - 2.60 mg/dL   Phosphorus    Collection Time: 07/16/22  7:10 AM   Result Value Ref Range    Phosphorus Level 3.4 2.3 - 4.7 mg/dL   Acetaminophen Level    Collection Time: 07/16/22  7:10 AM   Result Value Ref Range    Acetaminophen Level <17.4 (L) 17.4 - 30.0 ug/ml   Phosphorus    Collection Time: 07/17/22  2:15 AM   Result Value Ref Range    Phosphorus Level 3.3 2.3 - 4.7 mg/dL   Magnesium    Collection Time: 07/17/22  2:15 AM   Result Value Ref Range    Magnesium Level  2.00 1.60 - 2.60 mg/dL   Comprehensive Metabolic Panel    Collection Time: 07/17/22  2:15 AM   Result Value Ref Range    Sodium Level 141 136 - 145 mmol/L    Potassium Level 3.3 (L) 3.5 - 5.1 mmol/L    Chloride 106 98 - 107 mmol/L    Carbon Dioxide 26 22 - 29 mmol/L    Glucose Level 91 74 - 100 mg/dL    Blood Urea Nitrogen 4.6 (L) 9.8 - 20.1 mg/dL    Creatinine 0.50 (L) 0.55 - 1.02 mg/dL    Calcium Level Total 8.2 (L) 8.4 - 10.2 mg/dL    Protein Total 4.6 (L) 6.4 - 8.3 gm/dL    Albumin Level 2.8 (L) 3.5 - 5.0 gm/dL    Globulin 1.8 (L) 2.4 - 3.5 gm/dL    Albumin/Globulin Ratio 1.6 1.1 - 2.0 ratio    Bilirubin Total 0.2 <=1.5 mg/dL    Alkaline Phosphatase 50 40 - 150 unit/L    Alanine Aminotransferase 83 (H) 0 - 55 unit/L    Aspartate Aminotransferase 81 (H) 5 - 34 unit/L    Estimated GFR-Non  >60 mls/min/1.73/m2   CBC with Differential    Collection Time: 07/17/22  2:15 AM   Result Value Ref Range    WBC 8.6 4.5 - 11.5 x10(3)/mcL    RBC 3.55 (L) 4.20 - 5.40 x10(6)/mcL    Hgb 10.7 (L) 12.0 - 16.0 gm/dL    Hct 33.1 (L) 37.0 - 47.0 %    MCV 93.2 80.0 - 94.0 fL    MCH 30.1 27.0 - 31.0 pg    MCHC 32.3 (L) 33.0 - 36.0 mg/dL    RDW 13.4 11.5 - 17.0 %    Platelet 209 130 - 400 x10(3)/mcL    MPV 9.9 7.4 - 10.4 fL    Neut % 57.8 %    Lymph % 35.1 %    Mono % 4.2 %    Eos % 2.4 %    Basophil % 0.3 %    Lymph # 3.02 0.6 - 4.6 x10(3)/mcL    Neut # 5.0 2.1 - 9.2 x10(3)/mcL    Mono # 0.36 0.1 - 1.3 x10(3)/mcL    Eos # 0.21 0 - 0.9 x10(3)/mcL    Baso # 0.03 0 - 0.2 x10(3)/mcL    IG# 0.02 0 - 0.04 x10(3)/mcL    IG% 0.2 %    NRBC% 0.0 %        No results found.       Assessment and Plan     Overdose on hydrocodone  Major depression  Essential hypertension

## 2022-07-18 NOTE — PSYCH EVALUATION
"7/18/2022 9:18 AM   Yany Yuan   1970   [id number]            Psychiatry Inpatient Admission Note    Date of Admission: 7/17/2022  7:42 PM    Current Legal Status: Physician's Emergency Certificate (PEC)    Chief Complaint: Suicide attempt    SUBJECTIVE:   History of Present Illness:   Yany Yuan is a 51 y.o. female placed under a PEC at [facility] after a reported overdose on an unknown amount of Norco 10mg and Flexeril 10mg.  Her boyfriend (now ex) had given her a Jeep but took it back when they brok up.  She was getting an Uber to and from work but ran out of money and got evicted from her apartment.  One prior inpatient stay here last year.  Cooperative during my evaluation.  Admitted for medication management and safety monitoring.        Past Psychiatric History:   Previous Psychiatric Hospitalizations: One prior a year ago   Previous Suicide Attempts: one prior last year   Outpatient psychiatrist: None    Past Medical/Surgical History:   No past medical history on file.  No past surgical history on file.      Family Psychiatric History:   None known     Allergies:   Review of patient's allergies indicates:   Allergen Reactions    Tetracyclines Rash       Substance Abuse History:   Tobacco: 1/2 ppd  Alcohol: "Hardly ever"  Illicit Substances: Denies  Treatment: Denies      Current Medications:   Home Psychiatric Meds: She was on Effexor, but ran ut.  Was taking Zoloft in it's place since she had this at home.  Previously has been on Lamictal.    Scheduled Meds:    amLODIPine  10 mg Oral Daily    cyclobenzaprine  10 mg Oral Nightly    gabapentin  300 mg Oral Nightly    nicotine  1 patch Transdermal Daily    OLANZapine  5 mg Oral QHS    OXcarbazepine  150 mg Oral BID    pantoprazole  40 mg Oral Daily    sertraline  100 mg Oral Daily      PRN Meds: acetaminophen, aluminum-magnesium hydroxide-simethicone, haloperidoL **AND** diphenhydrAMINE **AND** LORazepam **AND** haloperidol lactate **AND** " "diphenhydrAMINE **AND** lorazepam, hydrOXYzine HCL, magnesium hydroxide 400 mg/5 ml, oxymetazoline, trazodone   Psychotherapeutics (From admission, onward)            Start     Stop Route Frequency Ordered    07/18/22 0900  sertraline tablet 100 mg         -- Oral Daily 07/17/22 1943 07/17/22 2100  OLANZapine tablet 5 mg         -- Oral Nightly 07/17/22 1943 07/17/22 1942  traZODone tablet 100 mg         -- Oral Nightly PRN 07/17/22 1943 07/17/22 1942  haloperidoL tablet 10 mg  (Med - Acute  Behavioral Management)        "And" Linked Group Details    -- Oral Every 4 hours PRN 07/17/22 1943 07/17/22 1942  LORazepam tablet 2 mg  (Med - Acute  Behavioral Management)        "And" Linked Group Details    -- Oral Every 4 hours PRN 07/17/22 1943 07/17/22 1942  haloperidol lactate injection 10 mg  (Med - Acute  Behavioral Management)        "And" Linked Group Details    -- IM Every 4 hours PRN 07/17/22 1943 07/17/22 1942  lorazepam injection 2 mg  (Med - Acute  Behavioral Management)        "And" Linked Group Details    -- IM Every 4 hours PRN 07/17/22 1943            Social History:  Housing Status: Homeless.  Recently evicted.  Relationship Status/Sexual Orientation:  but    Children: One son that lives in [place]  Education: Some college   Employment Status/Info: Works at [employer]         OBJECTIVE:   Medical Review Of Systems:  Constitutional: negative  Respiratory: negative  Cardiovascular: negative  Gastrointestinal: negative  Genitourinary:negative  Musculoskeletal:negative  Neurological: negative    Vitals   Vitals:    07/17/22 2025   BP: 133/78   Pulse: 93   Resp: 18   Temp: 98.1 °F (36.7 °C)        Labs/Imaging/Studies:   Recent Results (from the past 48 hour(s))   Phosphorus    Collection Time: 07/17/22  2:15 AM   Result Value Ref Range    Phosphorus Level 3.3 2.3 - 4.7 mg/dL   Magnesium    Collection Time: 07/17/22  2:15 AM   Result Value Ref Range    Magnesium Level 2.00 " 1.60 - 2.60 mg/dL   Comprehensive Metabolic Panel    Collection Time: 07/17/22  2:15 AM   Result Value Ref Range    Sodium Level 141 136 - 145 mmol/L    Potassium Level 3.3 (L) 3.5 - 5.1 mmol/L    Chloride 106 98 - 107 mmol/L    Carbon Dioxide 26 22 - 29 mmol/L    Glucose Level 91 74 - 100 mg/dL    Blood Urea Nitrogen 4.6 (L) 9.8 - 20.1 mg/dL    Creatinine 0.50 (L) 0.55 - 1.02 mg/dL    Calcium Level Total 8.2 (L) 8.4 - 10.2 mg/dL    Protein Total 4.6 (L) 6.4 - 8.3 gm/dL    Albumin Level 2.8 (L) 3.5 - 5.0 gm/dL    Globulin 1.8 (L) 2.4 - 3.5 gm/dL    Albumin/Globulin Ratio 1.6 1.1 - 2.0 ratio    Bilirubin Total 0.2 <=1.5 mg/dL    Alkaline Phosphatase 50 40 - 150 unit/L    Alanine Aminotransferase 83 (H) 0 - 55 unit/L    Aspartate Aminotransferase 81 (H) 5 - 34 unit/L    Estimated GFR-Non  >60 mls/min/1.73/m2   CBC with Differential    Collection Time: 07/17/22  2:15 AM   Result Value Ref Range    WBC 8.6 4.5 - 11.5 x10(3)/mcL    RBC 3.55 (L) 4.20 - 5.40 x10(6)/mcL    Hgb 10.7 (L) 12.0 - 16.0 gm/dL    Hct 33.1 (L) 37.0 - 47.0 %    MCV 93.2 80.0 - 94.0 fL    MCH 30.1 27.0 - 31.0 pg    MCHC 32.3 (L) 33.0 - 36.0 mg/dL    RDW 13.4 11.5 - 17.0 %    Platelet 209 130 - 400 x10(3)/mcL    MPV 9.9 7.4 - 10.4 fL    Neut % 57.8 %    Lymph % 35.1 %    Mono % 4.2 %    Eos % 2.4 %    Basophil % 0.3 %    Lymph # 3.02 0.6 - 4.6 x10(3)/mcL    Neut # 5.0 2.1 - 9.2 x10(3)/mcL    Mono # 0.36 0.1 - 1.3 x10(3)/mcL    Eos # 0.21 0 - 0.9 x10(3)/mcL    Baso # 0.03 0 - 0.2 x10(3)/mcL    IG# 0.02 0 - 0.04 x10(3)/mcL    IG% 0.2 %    NRBC% 0.0 %   CBC with Differential    Collection Time: 07/18/22  7:19 AM   Result Value Ref Range    WBC 7.4 4.5 - 11.5 x10(3)/mcL    RBC 3.92 (L) 4.20 - 5.40 x10(6)/mcL    Hgb 12.1 12.0 - 16.0 gm/dL    Hct 36.0 (L) 37.0 - 47.0 %    MCV 91.8 80.0 - 94.0 fL    MCH 30.9 27.0 - 31.0 pg    MCHC 33.6 33.0 - 36.0 mg/dL    RDW 13.2 11.5 - 17.0 %    Platelet 255 130 - 400 x10(3)/mcL    MPV 10.0 7.4 - 10.4 fL     Neut % 52.7 %    Lymph % 40.1 %    Mono % 3.9 %    Eos % 2.7 %    Basophil % 0.3 %    Lymph # 2.97 0.6 - 4.6 x10(3)/mcL    Neut # 3.9 2.1 - 9.2 x10(3)/mcL    Mono # 0.29 0.1 - 1.3 x10(3)/mcL    Eos # 0.20 0 - 0.9 x10(3)/mcL    Baso # 0.02 0 - 0.2 x10(3)/mcL    IG# 0.02 0 - 0.04 x10(3)/mcL    IG% 0.3 %    NRBC% 0.0 %   Comprehensive metabolic panel    Collection Time: 07/18/22  7:19 AM   Result Value Ref Range    Sodium Level 142 136 - 145 mmol/L    Potassium Level 4.0 3.5 - 5.1 mmol/L    Chloride 105 98 - 107 mmol/L    Carbon Dioxide 28 22 - 29 mmol/L    Glucose Level 79 74 - 100 mg/dL    Blood Urea Nitrogen 5.2 (L) 9.8 - 20.1 mg/dL    Creatinine 0.58 0.55 - 1.02 mg/dL    Calcium Level Total 8.7 8.4 - 10.2 mg/dL    Protein Total 5.5 (L) 6.4 - 8.3 gm/dL    Albumin Level 3.0 (L) 3.5 - 5.0 gm/dL    Globulin 2.5 2.4 - 3.5 gm/dL    Albumin/Globulin Ratio 1.2 1.1 - 2.0 ratio    Bilirubin Total 0.2 <=1.5 mg/dL    Alkaline Phosphatase 55 40 - 150 unit/L    Alanine Aminotransferase 95 (H) 0 - 55 unit/L    Aspartate Aminotransferase 73 (H) 5 - 34 unit/L    Estimated GFR-Non  >60 mls/min/1.73/m2   TSH    Collection Time: 07/18/22  7:19 AM   Result Value Ref Range    Thyroid Stimulating Hormone 0.8788 0.3500 - 4.9400 uIU/mL      No results found for: PHENYTOIN, PHENOBARB, VALPROATE, CBMZ        Psychiatric Mental Status Exam:  General Appearance: appears stated age, well-developed, well-nourished  Arousal: alert  Behavior: cooperative  Movements and Motor Activity: no abnormal involuntary movements noted  Orientation: oriented to person, place, time, and situation  Speech: normal rate, normal rhythm, normal volume, normal tone  Mood: Depressed  Affect: mood-congruent, constricted  Thought Process: linear  Associations: intact  Thought Content and Perceptions: (+)suicidal ideation with recent suicide attempt, no homicidal ideation, no auditory hallucinations, no visual hallucinations, no paranoid ideation, no  ideas of reference, no evidence of delusions or psychosis  Recent and Remote Memory: recent memory intact, remote memory intact  Attention and Concentration: intact, attentive to conversation  Fund of Knowledge: intact, aware of current events, vocabulary appropriate  Insight: intact  Judgment: questionable        Patient Strengths:  Access to care and Able to verbalize needs      Patient Liabilities:  Depression      Discharge Criteria:  Improved mood and Improved coping skills    ASSESSMENT/PLAN:   Diagnosis:  Major Depressive Disorder, deacon greenberg (F33.2)    No past medical history on file.       Plan:  -Admit to [facility]  -Zoloft 50mg daily  -Will attempt to obtain outside psychiatric records if available  -SW to assist with aftercare planning and collateral  -Once stable discharge home with outpatient follow up care and/or rehab  -Continue inpatient treatment under a PEC and/or CEC for danger to self/ danger to others/grave disability as evidenced by danger to self      Estimated length of stay: 5-7 days    Estimated Disposition: Placement needed    Estimated Follow-up: Outpatient medication management      On this date, I have reviewed the medical history and Nursing Assessment, as well as records from referral source.  I have evaluated the mental status of the above named person and concur with the findings of all assessments.  I have provided medical direction for the development of the Treatment Plan.    I conclude that this patient meets admission criteria for inpatient treatment.  I certify that this patient poses a danger to self or others, or would otherwise be considered gravely disabled based on this assessment and/or provided collateral information.     I have provided medical direction for the development of the Treatment plan.  These services will be provided while this patient is under my care and will be based on an individualized plan of care.  The patient can demonstrate a reasonable expectation  of improvement in his/her disorder as a result of the active treatment being provided.      Madhu Simmons M.D.

## 2022-07-18 NOTE — PLAN OF CARE
Problem: Adult Inpatient Plan of Care  Goal: Plan of Care Review  Outcome: Ongoing, Progressing  Goal: Patient-Specific Goal (Individualized)  Outcome: Ongoing, Progressing  Goal: Absence of Hospital-Acquired Illness or Injury  Outcome: Ongoing, Progressing  Goal: Optimal Comfort and Wellbeing  Outcome: Ongoing, Progressing  Goal: Readiness for Transition of Care  Outcome: Ongoing, Progressing     Problem: Activity and Energy Impairment (Depressive Signs/Symptoms)  Goal: Optimized Energy Level (Depressive Signs/Symptoms)  Outcome: Ongoing, Progressing     Problem: Feelings of Worthlessness, Hopelessness or Excessive Guilt (Depressive Signs/Symptoms)  Goal: Enhanced Self-Esteem and Confidence (Depressive Signs/Symptoms)  Outcome: Ongoing, Progressing     Problem: Mood Impairment (Depressive Signs/Symptoms)  Goal: Improved Mood Symptoms (Depressive Signs/Symptoms)  Outcome: Ongoing, Progressing     Problem: Nutrition Imbalance (Depressive Signs/Symptoms)  Goal: Optimized Nutrition Intake  Outcome: Ongoing, Progressing     Problem: Sleep Disturbance (Depressive Signs/Symptoms)  Goal: Improved Sleep (Depressive Signs/Symptoms)  Outcome: Ongoing, Progressing     Problem: Social, Occupational or Functional Impairment (Depressive Signs/Symptoms)  Goal: Enhanced Social, Occupational or Functional Skills (Depressive Signs/Symptoms)  Outcome: Ongoing, Progressing

## 2022-07-18 NOTE — PROGRESS NOTES
07/18/22 1000   [facility] [place] Group Therapy   Group Name Therapeutic Recreation   Specific Interventions Skilled Activity Mild Exercises   Participation Level Minimal   Participation Quality Cooperative;Social   Insight/Motivation Limited   Affect/Mood Display Appropriate   Cognition Oriented   Psychomotor WNL

## 2022-07-18 NOTE — PROGRESS NOTES
[facility]  Psychiatry  Progress Note    Code Status: Full Code  Admission Date: 7/17/2022  Hospital Length of Stay: 0 days  Attending Physician: Madhu Simmons MD  Primary Care Provider: Primary Doctor No    Current Legal Status: 's Emergency Certificate (CEC)      Subjective:     Principal Problem:Intentional drug overdose    Chief Complaint: Hospital follow up for drug overdose.      HPI : Patient is a 51 year old white female admitted to ICU following intentional overdose of Norco and Flexeril tablets. Ms Yuan is currently awake and less withdrawn.Trleptal and Olanzapine were started on 7/16/22. Patient reports compliance and no side effect experienced. She now denies suicidal ideations. I asked patient about her goals for oncoming days, she reported that her sister had rescheduled her trip top [place] and may not be here for another 4 weeks.  I discussed the needs to transfer to inpatient psychiatric facility for adequate stabilization and medication management. Patient now agrees to plan.   Legal status is CEC as patient awaits placement.      Scheduled Medications:   gabapentin  300 mg Oral QHS    [START ON 7/18/2022] nicotine  1 patch Transdermal Daily    OLANZapine  5 mg Oral QHS    OXcarbazepine  150 mg Oral BID    [START ON 7/18/2022] sertraline  100 mg Oral Daily       PRN Medications:   acetaminophen, aluminum-magnesium hydroxide-simethicone, haloperidoL **AND** diphenhydrAMINE **AND** LORazepam **AND** haloperidol lactate **AND** diphenhydrAMINE **AND** lorazepam, hydrOXYzine HCL, magnesium hydroxide 400 mg/5 ml, trazodone    Review of patient's allergies indicates:   Allergen Reactions    Tetracyclines Rash       Assessment/Plan:   Psychiatric:         Attention and Perception: Perception normal.      Mood and Affect: Mood is anxious and depressed. Affect is flat     Speech: Speech is slow and low toned         Behavior: Behavior is  withdrawn.         Cognition and Memory: Cognition normal.         Judgment: Judgment is impulsive and inappropriate.      Continue current treatment plan pending placement  Psych will follow up as needed.     Total time:  25 with greater than 50% of this time spent in counseling and/or coordination of care.       REE Flower   Psychiatry  [facility]

## 2022-07-18 NOTE — NURSING
This 51 year old female admitted per PEC/CEC from [facility] north unit with diagnosis of depression. She is alert, oriented and cooperative. Contraband search done. Patient oriented to unit and rules. She was treated at hospital for overdose of home medications. She currently denies any thoughts of self harm and voices motivation to getting back to her old self. Mood is tearful at times. Staff will monitor closely for safety and encourage her in treatment process.

## 2022-07-19 LAB — POCT GLUCOSE: 77 MG/DL (ref 70–110)

## 2022-07-19 PROCEDURE — S4991 NICOTINE PATCH NONLEGEND: HCPCS | Performed by: PSYCHIATRY & NEUROLOGY

## 2022-07-19 PROCEDURE — 25000003 PHARM REV CODE 250: Performed by: PSYCHIATRY & NEUROLOGY

## 2022-07-19 PROCEDURE — 11400000 HC PSYCH PRIVATE ROOM

## 2022-07-19 PROCEDURE — 25000003 PHARM REV CODE 250: Performed by: PEDIATRICS

## 2022-07-19 RX ADMIN — SERTRALINE HYDROCHLORIDE 100 MG: 50 TABLET ORAL at 08:07

## 2022-07-19 RX ADMIN — PANTOPRAZOLE SODIUM 40 MG: 40 TABLET, DELAYED RELEASE ORAL at 08:07

## 2022-07-19 RX ADMIN — NICOTINE 1 PATCH: 21 PATCH, EXTENDED RELEASE TRANSDERMAL at 08:07

## 2022-07-19 RX ADMIN — ACETAMINOPHEN 650 MG: 325 TABLET, FILM COATED ORAL at 08:07

## 2022-07-19 RX ADMIN — AMLODIPINE BESYLATE 10 MG: 5 TABLET ORAL at 08:07

## 2022-07-19 RX ADMIN — CYCLOBENZAPRINE 10 MG: 10 TABLET, FILM COATED ORAL at 08:07

## 2022-07-19 RX ADMIN — GABAPENTIN 300 MG: 300 CAPSULE ORAL at 08:07

## 2022-07-19 RX ADMIN — TRAZODONE HYDROCHLORIDE 100 MG: 100 TABLET ORAL at 08:07

## 2022-07-19 NOTE — PROGRESS NOTES
"7/19/2022 4:03 PM   Yany Yuan   1970   [id number]        Psychiatry Progress Note     SUBJECTIVE:   Yany Yuan is a 51 y.o. female placed under a PEC at [facility] after a reported overdose on an unknown amount of Norco 10mg and Flexeril 10mg - "Everything just snow-balled".  Patient reports that her mood today is "pretty good".  Patient denies side effects to the current medication regimen.  Patient denies SI and HI today.  No overt behavioral issues.  Will continue current treatment plan  and monitor for safety and behavior.  . Follow-up care will be at [facility].      Current Medications:   Scheduled Meds:    amLODIPine  10 mg Oral Daily    cyclobenzaprine  10 mg Oral Nightly    gabapentin  300 mg Oral Nightly    nicotine  1 patch Transdermal Daily    pantoprazole  40 mg Oral Daily    sertraline  100 mg Oral Daily      PRN Meds: acetaminophen, aluminum-magnesium hydroxide-simethicone, haloperidoL **AND** diphenhydrAMINE **AND** LORazepam **AND** haloperidol lactate **AND** diphenhydrAMINE **AND** lorazepam, hydrOXYzine HCL, magnesium hydroxide 400 mg/5 ml, oxymetazoline, trazodone   Psychotherapeutics (From admission, onward)            Start     Stop Route Frequency Ordered    07/18/22 0900  sertraline tablet 100 mg         -- Oral Daily 07/17/22 1943 07/17/22 1942  traZODone tablet 100 mg         -- Oral Nightly PRN 07/17/22 1943 07/17/22 1942  haloperidoL tablet 10 mg  (Med - Acute  Behavioral Management)        "And" Linked Group Details    -- Oral Every 4 hours PRN 07/17/22 1943 07/17/22 1942  LORazepam tablet 2 mg  (Med - Acute  Behavioral Management)        "And" Linked Group Details    -- Oral Every 4 hours PRN 07/17/22 1943 07/17/22 1942  haloperidol lactate injection 10 mg  (Med - Acute  Behavioral Management)        "And" Linked Group Details    -- IM Every 4 hours PRN 07/17/22 1943 07/17/22 1942  lorazepam injection 2 mg  (Med - Acute  Behavioral Management)      " "  "And" Linked Group Details    -- IM Every 4 hours PRN 07/17/22 1943          Allergies:   Review of patient's allergies indicates:   Allergen Reactions    Tetracyclines Rash        OBJECTIVE:   Vitals   Vitals:    07/19/22 1100   BP: 124/82   Pulse: 91   Resp: 18   Temp: 97.9 °F (36.6 °C)        Labs/Imaging/Studies:   Recent Results (from the past 36 hour(s))   CBC with Differential    Collection Time: 07/18/22  7:19 AM   Result Value Ref Range    WBC 7.4 4.5 - 11.5 x10(3)/mcL    RBC 3.92 (L) 4.20 - 5.40 x10(6)/mcL    Hgb 12.1 12.0 - 16.0 gm/dL    Hct 36.0 (L) 37.0 - 47.0 %    MCV 91.8 80.0 - 94.0 fL    MCH 30.9 27.0 - 31.0 pg    MCHC 33.6 33.0 - 36.0 mg/dL    RDW 13.2 11.5 - 17.0 %    Platelet 255 130 - 400 x10(3)/mcL    MPV 10.0 7.4 - 10.4 fL    Neut % 52.7 %    Lymph % 40.1 %    Mono % 3.9 %    Eos % 2.7 %    Basophil % 0.3 %    Lymph # 2.97 0.6 - 4.6 x10(3)/mcL    Neut # 3.9 2.1 - 9.2 x10(3)/mcL    Mono # 0.29 0.1 - 1.3 x10(3)/mcL    Eos # 0.20 0 - 0.9 x10(3)/mcL    Baso # 0.02 0 - 0.2 x10(3)/mcL    IG# 0.02 0 - 0.04 x10(3)/mcL    IG% 0.3 %    NRBC% 0.0 %   SYPHILIS ANTIBODY (WITH REFLEX RPR)    Collection Time: 07/18/22  7:19 AM   Result Value Ref Range    Syphilis Antibody Nonreactive Nonreactive, Equivocal   Comprehensive metabolic panel    Collection Time: 07/18/22  7:19 AM   Result Value Ref Range    Sodium Level 142 136 - 145 mmol/L    Potassium Level 4.0 3.5 - 5.1 mmol/L    Chloride 105 98 - 107 mmol/L    Carbon Dioxide 28 22 - 29 mmol/L    Glucose Level 79 74 - 100 mg/dL    Blood Urea Nitrogen 5.2 (L) 9.8 - 20.1 mg/dL    Creatinine 0.58 0.55 - 1.02 mg/dL    Calcium Level Total 8.7 8.4 - 10.2 mg/dL    Protein Total 5.5 (L) 6.4 - 8.3 gm/dL    Albumin Level 3.0 (L) 3.5 - 5.0 gm/dL    Globulin 2.5 2.4 - 3.5 gm/dL    Albumin/Globulin Ratio 1.2 1.1 - 2.0 ratio    Bilirubin Total 0.2 <=1.5 mg/dL    Alkaline Phosphatase 55 40 - 150 unit/L    Alanine Aminotransferase 95 (H) 0 - 55 unit/L    Aspartate " "Aminotransferase 73 (H) 5 - 34 unit/L    Estimated GFR-Non  >60 mls/min/1.73/m2   TSH    Collection Time: 07/18/22  7:19 AM   Result Value Ref Range    Thyroid Stimulating Hormone 0.8788 0.3500 - 4.9400 uIU/mL        Psychiatric Mental Status Exam:  General Appearance: appears stated age, well-developed, well-nourished  Arousal: alert  Behavior: cooperative  Movements and Motor Activity: no abnormal involuntary movements noted  Orientation: oriented to person, place, time, and situation  Speech: normal rate, normal rhythm, normal volume, normal tone  Mood: "Pretty good"  Affect: mood-congruent  Thought Process: linear  Associations: intact  Thought Content and Perceptions: no suicidal ideation, however, recent suicide attempt, no homicidal ideation, no auditory hallucinations, no visual hallucinations, no paranoid ideation, no ideas of reference, no evidence of delusions or psychosis  Recent and Remote Memory: recent memory intact, remote memory intact  Attention and Concentration: intact, attentive to conversation  Fund of Knowledge: intact, aware of current events, vocabulary appropriate  Insight: intact  Judgment: questionable       ASSESSMENT/PLAN:   Diagnosis:  Major Depressive Disorder, recurret,evere (F33.2)    No past medical history on file.     Plan:  - Continue current POC    Rafael Mccartney  7/19/2022  "

## 2022-07-19 NOTE — PLAN OF CARE
Problem: Adult Inpatient Plan of Care  Goal: Plan of Care Review  Outcome: Ongoing, Progressing  Goal: Patient-Specific Goal (Individualized)  Outcome: Ongoing, Progressing  Goal: Absence of Hospital-Acquired Illness or Injury  Outcome: Ongoing, Progressing  Goal: Optimal Comfort and Wellbeing  Outcome: Ongoing, Progressing  Goal: Readiness for Transition of Care  Outcome: Ongoing, Progressing     Problem: Activity and Energy Impairment (Depressive Signs/Symptoms)  Goal: Optimized Energy Level (Depressive Signs/Symptoms)  Outcome: Ongoing, Progressing     Problem: Feelings of Worthlessness, Hopelessness or Excessive Guilt (Depressive Signs/Symptoms)  Goal: Enhanced Self-Esteem and Confidence (Depressive Signs/Symptoms)  Outcome: Ongoing, Progressing     Problem: Mood Impairment (Depressive Signs/Symptoms)  Goal: Improved Mood Symptoms (Depressive Signs/Symptoms)  Outcome: Ongoing, Progressing     Problem: Nutrition Imbalance (Depressive Signs/Symptoms)  Goal: Optimized Nutrition Intake  Outcome: Ongoing, Progressing     Problem: Sleep Disturbance (Depressive Signs/Symptoms)  Goal: Improved Sleep (Depressive Signs/Symptoms)  Outcome: Ongoing, Progressing     Problem: Social, Occupational or Functional Impairment (Depressive Signs/Symptoms)  Goal: Enhanced Social, Occupational or Functional Skills (Depressive Signs/Symptoms)  Outcome: Ongoing, Progressing     Problem: Suicide Risk  Goal: Absence of Self-Harm  Outcome: Ongoing, Progressing

## 2022-07-19 NOTE — PROGRESS NOTES
07/19/22 1000   [facility] Group Therapy   Group Name Therapeutic Recreation   Specific Interventions Skilled Activity Mild Exercises   Participation Level Minimal   Participation Quality Restive   Insight/Motivation Improved   Affect/Mood Display Flat   Cognition Oriented   Psychomotor WNL

## 2022-07-19 NOTE — PROGRESS NOTES
07/19/22 1400   [facility] Group Therapy   Group Name Therapeutic Recreation   Specific Interventions Skilled Activity Creative Expression   Participation Level Active;Supportive;Attentive;Sharing   Participation Quality Cooperative;Social   Insight/Motivation Improved   Affect/Mood Display Appropriate   Cognition Oriented   Psychomotor WNL

## 2022-07-19 NOTE — PROGRESS NOTES
Psychosocial Assessment     Pt is a 51 y.o. YO  female admitted due to   Patient Active Problem List   Diagnosis    Gastroesophageal reflux disease    Acute depression    Bipolar disorder    Family history of colon cancer    History of hysterectomy    Intervertebral disc disorder of lumbar region with myelopathy    Essential hypertension    Tobacco user    Intentional drug overdose    Suicidal ideation    Major depression, recurrent, chronic    Adjustment disorder with mixed anxiety and depressed mood    Bipolar I disorder with depression, severe    Pt states that she od'd because she was being evicted due to not being able to make rent after her boyfriend took her vehicle back and it was just too much at one time. Pt states that she is going to go stay with a friend until her sister can come down from [place] to get her.   Pt UDS was Positive for opiates.  PT ETOH <10 Pt reports  SI. Pt last inpatient  was about a year ago . Pt presents Depressed with CM staff 1:1. Pt originally from [place]  . Pt has  0 dependents, 1 grown son Pt  is Single .  Pt completed High school. Pt denies  service.  Pt reports financial issues. Pt unemployed.  Pt works at  unemployed. Pt denies legal issues. Pt states they do not receive comfort from spiritual practices. Pt emergency contact is Friend Ceci Tran. Their phone number is  [phone] . Pt discharge plan at this time is home.        07/18/22 0800   Initial Information   Source of Information patient   Stated Reason for Admission Suicide attempt   Patient Aware of Diagnosis yes   Limitations on Visitors/Phone Calls none   Temporary Family Living Arrangements (While Hospitalized) arrangements needed   Arrived From hospital   Current or Previous  Service none   Spiritual Beliefs   Spiritual, Cultural Beliefs, Jainism Practices, Values that Affect Care no   Abuse Screen (yes response referral indicated)   Feels Unsafe at Home or Work/School no   Feels  Threatened by Someone no   Does Anyone Try to Keep You From Having Contact with Others or Doing Things Outside Your Home? no   Physical Signs of Abuse Present no   Suicide Risk   Feels Like Hurting Self Current   Suicide Plan/Availability od on meds   Current Suicidal Precipitating Factors eviction, broke up with bf   AUDIT C   How often do you have a drink containing alcohol? 0   How many standard drinks containing alcohol do you have on a typical day? 0   How often do you have six or more drinks on one occasion? 0   AUDIT-C Total 0   Relationship/Environment   Primary Source of Support/Comfort child(patrick);friend;sibling(s)   Lives With   (homeles)   Resource/Environmental Concerns   Current Living Arrangements homeless   Transition Planning   Patient/Family Anticipates Transition to home with family   Transportation Anticipated family or friend will provide

## 2022-07-19 NOTE — PROGRESS NOTES
07/19/22 0800   General   Admit Date 07/17/22   Primary Diagnosis suicide attempt   Secondary Diagnosis mood d/o   Number of Children 1   Children Living? 1   Occupation    If you were to take part in activities, which of the following would you prefer? Both   Do you feel like you have enough to keep you busy now? Yes   Do you believe that you have the opportunity for physical activity? Yes   Activity Capabilities Minimum   Subjective   Patient states I got evicted and my BF broke up with me and took my jeep he gave me.   Assessment   Mobility ambulates independently   Transfers independently   Musculoskeletal pain   Visual Acuity normal vision   Visual Perception depth perception;color perception;recognizes letters;recognizes numbers   Hearing normal   Speech/Communication normal   Cognitive Concerns oriented x4   Emotional Concerns appears depressed   Leisure Interest Survey   Leisure Interest Survey No   Goals   Additional Documentation yes   Goal Formulation With patient   Time For Goal Achievement 7 days   Goal 1 Attend at least 3 TR groups prior to discharge   Goal 1-Progress passive attendance   Plan   Planned Therapy Intervention Group Recreational Therapy   Expected Length of Stay 5-7days   PT Frequency Minimum of 3 visits per week   Yany is a 51 female admitted for suicide attempt by OD of unknown amount of prescribed Norco & Flexeril and mood d/o, with a uds +opiates and 11.0BAL. Pt reports ability to perform her ADL's and is interested in MH & homelessness services.

## 2022-07-20 PROCEDURE — 25000003 PHARM REV CODE 250: Performed by: PSYCHIATRY & NEUROLOGY

## 2022-07-20 PROCEDURE — 25000003 PHARM REV CODE 250: Performed by: PEDIATRICS

## 2022-07-20 PROCEDURE — 11400000 HC PSYCH PRIVATE ROOM

## 2022-07-20 RX ADMIN — PANTOPRAZOLE SODIUM 40 MG: 40 TABLET, DELAYED RELEASE ORAL at 09:07

## 2022-07-20 RX ADMIN — TRAZODONE HYDROCHLORIDE 100 MG: 100 TABLET ORAL at 08:07

## 2022-07-20 RX ADMIN — GABAPENTIN 300 MG: 300 CAPSULE ORAL at 08:07

## 2022-07-20 RX ADMIN — CYCLOBENZAPRINE 10 MG: 10 TABLET, FILM COATED ORAL at 08:07

## 2022-07-20 RX ADMIN — SERTRALINE HYDROCHLORIDE 100 MG: 50 TABLET ORAL at 09:07

## 2022-07-20 RX ADMIN — AMLODIPINE BESYLATE 10 MG: 5 TABLET ORAL at 09:07

## 2022-07-20 NOTE — PROGRESS NOTES
07/20/22 1400   [facility] Group Therapy   Group Name Therapeutic Recreation   Specific Interventions Skilled Activity Creative Expression   Participation Level Active;Supportive;Attentive;Sharing   Participation Quality Cooperative;Social   Insight/Motivation Improved   Affect/Mood Display Appropriate   Cognition Oriented   Psychomotor WNL

## 2022-07-20 NOTE — PROGRESS NOTES
"7/20/2022 1:13 PM   Yany Yuan   1970   [id number]        Psychiatry Progress Note     SUBJECTIVE:   Yany Yuan is a 51 y.o. female placed under a PEC at [facility] after a reported overdose on an unknown amount of Norco 10mg and Flexeril 10mg.  Attends and participates in groups.  Some irritability at times.  States that her mood has been improving.  She reports that she will be able to stay with a friend who is a  in [place].  Follow-up will be with [facility].  Zoloft recently started and increased.  Will continue to monitor mood.       Current Medications:   Scheduled Meds:    amLODIPine  10 mg Oral Daily    cyclobenzaprine  10 mg Oral Nightly    gabapentin  300 mg Oral Nightly    nicotine  1 patch Transdermal Daily    pantoprazole  40 mg Oral Daily    sertraline  100 mg Oral Daily      PRN Meds: acetaminophen, aluminum-magnesium hydroxide-simethicone, haloperidoL **AND** diphenhydrAMINE **AND** LORazepam **AND** haloperidol lactate **AND** diphenhydrAMINE **AND** lorazepam, hydrOXYzine HCL, magnesium hydroxide 400 mg/5 ml, oxymetazoline, trazodone   Psychotherapeutics (From admission, onward)            Start     Stop Route Frequency Ordered    07/18/22 0900  sertraline tablet 100 mg         -- Oral Daily 07/17/22 1943 07/17/22 1942  traZODone tablet 100 mg         -- Oral Nightly PRN 07/17/22 1943 07/17/22 1942  haloperidoL tablet 10 mg  (Med - Acute  Behavioral Management)        "And" Linked Group Details    -- Oral Every 4 hours PRN 07/17/22 1943 07/17/22 1942  LORazepam tablet 2 mg  (Med - Acute  Behavioral Management)        "And" Linked Group Details    -- Oral Every 4 hours PRN 07/17/22 1943 07/17/22 1942  haloperidol lactate injection 10 mg  (Med - Acute  Behavioral Management)        "And" Linked Group Details    -- IM Every 4 hours PRN 07/17/22 1943 07/17/22 1942  lorazepam injection 2 mg  (Med - Acute  Behavioral Management)        "And" Linked Group " Details    -- IM Every 4 hours PRN 07/17/22 1943          Allergies:   Review of patient's allergies indicates:   Allergen Reactions    Tetracyclines Rash        OBJECTIVE:   Vitals   Vitals:    07/19/22 1900   BP: 135/88   Pulse: 96   Resp: 18   Temp: 97.7 °F (36.5 °C)        Labs/Imaging/Studies:   No results found for this or any previous visit (from the past 36 hour(s)).       Psychiatric Mental Status Exam:  General Appearance: appears stated age, well-developed, well-nourished  Arousal: alert  Behavior: cooperative  Movements and Motor Activity: no abnormal involuntary movements noted  Orientation: oriented to person, place, time, and situation  Speech: normal rate, normal rhythm, normal volume, normal tone  Mood: Depressed  Affect: mood-congruent, constricted  Thought Process: linear  Associations: intact  Thought Content and Perceptions: (+)suicidal ideation improving, no homicidal ideation, no auditory hallucinations, no visual hallucinations, no paranoid ideation, no ideas of reference, no evidence of delusions or psychosis  Recent and Remote Memory: recent memory intact, remote memory intact  Attention and Concentration: intact, attentive to conversation  Fund of Knowledge: intact, aware of current events, vocabulary appropriate  Insight: intact  Judgment: questionable    ASSESSMENT/PLAN:   Diagnosis:  Major Depressive Disorder, recurret,evere (F33.2)    No past medical history on file.     Plan:  -Continue current POC    Expected Disposition Plan: Friend's house        Madhu Simmons M.D.

## 2022-07-20 NOTE — NURSING
Patient stated her plan on discharge is to go stay with a friend.  Patient stated her last follow up was with [facility]. Advised patient that would have a follow up visit with [facility].  Discharge plan is for Friday 7/22/22

## 2022-07-20 NOTE — PLAN OF CARE
Yany attends TR groups, is pleasant & cooperative, interacts well with peers & staff, and attends her ADL's.

## 2022-07-21 VITALS
OXYGEN SATURATION: 96 % | TEMPERATURE: 98 F | DIASTOLIC BLOOD PRESSURE: 91 MMHG | WEIGHT: 157.81 LBS | SYSTOLIC BLOOD PRESSURE: 148 MMHG | HEIGHT: 59 IN | HEART RATE: 92 BPM | BODY MASS INDEX: 31.81 KG/M2 | RESPIRATION RATE: 18 BRPM

## 2022-07-21 PROBLEM — F33.2 MAJOR DEPRESSIVE DISORDER, RECURRENT, SEVERE WITHOUT PSYCHOTIC BEHAVIOR: Status: ACTIVE | Noted: 2022-07-21

## 2022-07-21 PROCEDURE — 25000003 PHARM REV CODE 250: Performed by: PEDIATRICS

## 2022-07-21 PROCEDURE — 25000003 PHARM REV CODE 250: Performed by: PSYCHIATRY & NEUROLOGY

## 2022-07-21 PROCEDURE — 11400000 HC PSYCH PRIVATE ROOM

## 2022-07-21 RX ORDER — CYCLOBENZAPRINE HCL 10 MG
10 TABLET ORAL NIGHTLY
Qty: 30 TABLET | Refills: 0 | Status: SHIPPED | OUTPATIENT
Start: 2022-07-21 | End: 2023-10-04 | Stop reason: ALTCHOICE

## 2022-07-21 RX ORDER — SERTRALINE HYDROCHLORIDE 100 MG/1
100 TABLET, FILM COATED ORAL DAILY
Qty: 30 TABLET | Refills: 0 | Status: SHIPPED | OUTPATIENT
Start: 2022-07-21 | End: 2023-10-04 | Stop reason: ALTCHOICE

## 2022-07-21 RX ORDER — AMLODIPINE BESYLATE 10 MG/1
10 TABLET ORAL DAILY
Qty: 30 TABLET | Refills: 0 | Status: SHIPPED | OUTPATIENT
Start: 2022-07-21 | End: 2023-10-04 | Stop reason: ALTCHOICE

## 2022-07-21 RX ORDER — GABAPENTIN 300 MG/1
300 CAPSULE ORAL NIGHTLY
Qty: 30 CAPSULE | Refills: 0 | Status: SHIPPED | OUTPATIENT
Start: 2022-07-21 | End: 2023-10-04 | Stop reason: ALTCHOICE

## 2022-07-21 RX ADMIN — CYCLOBENZAPRINE 10 MG: 10 TABLET, FILM COATED ORAL at 08:07

## 2022-07-21 RX ADMIN — TRAZODONE HYDROCHLORIDE 100 MG: 100 TABLET ORAL at 08:07

## 2022-07-21 RX ADMIN — HYDROXYZINE HYDROCHLORIDE 50 MG: 50 TABLET, FILM COATED ORAL at 09:07

## 2022-07-21 RX ADMIN — PANTOPRAZOLE SODIUM 40 MG: 40 TABLET, DELAYED RELEASE ORAL at 09:07

## 2022-07-21 RX ADMIN — AMLODIPINE BESYLATE 10 MG: 5 TABLET ORAL at 09:07

## 2022-07-21 RX ADMIN — ACETAMINOPHEN 650 MG: 325 TABLET, FILM COATED ORAL at 11:07

## 2022-07-21 RX ADMIN — SERTRALINE HYDROCHLORIDE 100 MG: 50 TABLET ORAL at 09:07

## 2022-07-21 RX ADMIN — HYDROXYZINE HYDROCHLORIDE 50 MG: 50 TABLET, FILM COATED ORAL at 08:07

## 2022-07-21 RX ADMIN — GABAPENTIN 300 MG: 300 CAPSULE ORAL at 08:07

## 2022-07-21 NOTE — GROUP NOTE
Group Psychotherapy       Group Focus: Psychodynamic Group Psychotherapy   Group topic: Anger Management. Therapist explored patients need for increase in effective communication skills, increase in awareness of triggers and understanding anger.??Patient was able to discuss anger triggers, how to have expressive anger in healthy ways. Patient continues to make progress towards treatment goals.??      Group Start Time: 1045  Group End Time:  1115  Groups Date: 7/21/2022  Group Topic:  Behavioral Health  Group Department: [facility] Behavioral Health Unit  Group Facilitators:  Jaron Harley LMSW  _____________________________________________________________________    Patient Name: Yany Yuan  MRN: [id number]  Patient Class: IP- Psych   Admission Date\Time: 7/17/2022  7:42 PM  Hospital Length of Stay: 4  Primary Care Provider: Primary Doctor No     Referred by: Acute Psychiatry Unit Treatment Team     Target symptoms: Depression     Patient's response to treatment: Active Listening and Self-disclosure     Progress toward goals: Progressing well          Plan: Continue treatment on APU

## 2022-07-21 NOTE — PROGRESS NOTES
"7/21/2022 1:13 PM   Yany Yuan   1970   [id number]        Psychiatry Progress Note     SUBJECTIVE:   Yany Yuan is a 51 y.o. female placed under a PEC at [facility] after a reported overdose on an unknown amount of Norco 10mg and Flexeril 10mg.  Due for discharge tomorrow.  No acute suicidal ideations.  Will be staying with a friend in [place] and following-up with [facility].     Current Medications:   Scheduled Meds:    amLODIPine  10 mg Oral Daily    cyclobenzaprine  10 mg Oral Nightly    gabapentin  300 mg Oral Nightly    nicotine  1 patch Transdermal Daily    pantoprazole  40 mg Oral Daily    sertraline  100 mg Oral Daily      PRN Meds: acetaminophen, aluminum-magnesium hydroxide-simethicone, haloperidoL **AND** diphenhydrAMINE **AND** LORazepam **AND** haloperidol lactate **AND** diphenhydrAMINE **AND** lorazepam, hydrOXYzine HCL, magnesium hydroxide 400 mg/5 ml, trazodone   Psychotherapeutics (From admission, onward)            Start     Stop Route Frequency Ordered    07/18/22 0900  sertraline tablet 100 mg         -- Oral Daily 07/17/22 1943 07/17/22 1942  traZODone tablet 100 mg         -- Oral Nightly PRN 07/17/22 1943 07/17/22 1942  haloperidoL tablet 10 mg  (Med - Acute  Behavioral Management)        "And" Linked Group Details    -- Oral Every 4 hours PRN 07/17/22 1943 07/17/22 1942  LORazepam tablet 2 mg  (Med - Acute  Behavioral Management)        "And" Linked Group Details    -- Oral Every 4 hours PRN 07/17/22 1943 07/17/22 1942  haloperidol lactate injection 10 mg  (Med - Acute  Behavioral Management)        "And" Linked Group Details    -- IM Every 4 hours PRN 07/17/22 1943 07/17/22 1942  lorazepam injection 2 mg  (Med - Acute  Behavioral Management)        "And" Linked Group Details    -- IM Every 4 hours PRN 07/17/22 1943          Allergies:   Review of patient's allergies indicates:   Allergen Reactions    Tetracyclines Rash        OBJECTIVE:   Vitals   Vitals: " "   07/20/22 1900   BP: 130/79   Pulse: 94   Resp: 18   Temp: 98.6 °F (37 °C)        Labs/Imaging/Studies:   No results found for this or any previous visit (from the past 36 hour(s)).       Psychiatric Mental Status Exam:  General Appearance: appears stated age, well-developed, well-nourished  Arousal: alert  Behavior: cooperative  Movements and Motor Activity: no abnormal involuntary movements noted  Orientation: oriented to person, place, time, and situation  Speech: normal rate, normal rhythm, normal volume, normal tone  Mood: "Ok"  Affect: mood-congruent  Thought Process: linear, logical  Associations: intact  Thought Content and Perceptions: no suicidal ideation, no homicidal ideation, no auditory hallucinations, no visual hallucinations, no paranoid ideation, no ideas of reference, no evidence of delusions or psychosis  Recent and Remote Memory: recent memory intact, remote memory intact  Attention and Concentration: intact, attentive to conversation  Fund of Knowledge: intact, aware of current events, vocabulary appropriate  Insight: intact  Judgment: intact      ASSESSMENT/PLAN:   Diagnosis:  Major Depressive Disorder, recurret,evere (F33.2)    No past medical history on file.     Plan:  -Continue current POC    Expected Disposition Plan: Friend's house        Madhu Simmons M.D.  "

## 2022-07-21 NOTE — PROGRESS NOTES
07/21/22 1000   [facility] Group Therapy   Group Name Therapeutic Recreation   Specific Interventions Skilled Activity Mild Exercises   Participation Level Active;Supportive;Attentive;Sharing   Participation Quality Cooperative;Social   Insight/Motivation Applies New Skills   Affect/Mood Display Appropriate   Cognition Oriented   Psychomotor WNL

## 2022-07-21 NOTE — PROGRESS NOTES
07/21/22 1400   [facility] Group Therapy   Group Name Therapeutic Recreation   Specific Interventions Skilled Activity Creative Expression   Participation Level Active;Supportive;Attentive   Participation Quality Cooperative;Social   Insight/Motivation Applies New Skills   Affect/Mood Display Appropriate   Cognition Oriented   Psychomotor WNL

## 2022-07-21 NOTE — PLAN OF CARE
Continue current POC.     Pt in pleasant spirits and mood, denies current SI or plan, states she has learned some coping skills and has a wonderful support system and will now ask for help.

## 2022-07-22 PROCEDURE — 25000003 PHARM REV CODE 250: Performed by: PSYCHIATRY & NEUROLOGY

## 2022-07-22 PROCEDURE — 25000003 PHARM REV CODE 250: Performed by: PEDIATRICS

## 2022-07-22 RX ADMIN — AMLODIPINE BESYLATE 10 MG: 5 TABLET ORAL at 08:07

## 2022-07-22 RX ADMIN — PANTOPRAZOLE SODIUM 40 MG: 40 TABLET, DELAYED RELEASE ORAL at 08:07

## 2022-07-22 RX ADMIN — SERTRALINE HYDROCHLORIDE 100 MG: 50 TABLET ORAL at 08:07

## 2022-07-22 NOTE — NURSING
Pt discharged home per MD orders. Discharge instructions reviewed with patient including post-facility mental health follow up at [facility]. Rx provided for medications listed on AVS. Pt verbalized understanding and acceptance and states readiness for discharge. Pt's home meds returned to her, belongings gathered by MHT and returned to patient. Pt escorted to facility exit with belongings for private ride home.

## 2022-07-22 NOTE — CARE UPDATE
Transition of Care record faxed to [facility] 07/22/2022 @ [phone]

## 2022-08-12 ENCOUNTER — HOSPITAL ENCOUNTER (EMERGENCY)
Facility: HOSPITAL | Age: 52
Discharge: HOME | End: 2022-08-12
Attending: STUDENT IN AN ORGANIZED HEALTH CARE EDUCATION/TRAINING PROGRAM
Payer: COMMERCIAL

## 2022-08-12 VITALS
HEIGHT: 59 IN | TEMPERATURE: 98 F | WEIGHT: 145 LBS | BODY MASS INDEX: 29.23 KG/M2 | HEART RATE: 75 BPM | RESPIRATION RATE: 12 BRPM | DIASTOLIC BLOOD PRESSURE: 77 MMHG | SYSTOLIC BLOOD PRESSURE: 119 MMHG | OXYGEN SATURATION: 94 %

## 2022-08-12 DIAGNOSIS — S80.02XA CONTUSION OF LEFT KNEE, INITIAL ENCOUNTER: ICD-10-CM

## 2022-08-12 DIAGNOSIS — S20.229A CONTUSION OF BACK, UNSPECIFIED LATERALITY, INITIAL ENCOUNTER: ICD-10-CM

## 2022-08-12 DIAGNOSIS — T14.90XA TRAUMA: ICD-10-CM
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
## 2022-08-12 LAB
ALBUMIN SERPL-MCNC: 4.1 GM/DL (ref 3.5–5)
ALBUMIN/GLOB SERPL: 1.4 RATIO (ref 1.1–2)
ALP SERPL-CCNC: 79 UNIT/L (ref 40–150)
ALT SERPL-CCNC: 15 UNIT/L (ref 0–55)
APTT PPP: 30 SECONDS (ref 23.2–33.7)
AST SERPL-CCNC: 20 UNIT/L (ref 5–34)
BASOPHILS # BLD AUTO: 0.03 X10(3)/MCL (ref 0–0.2)
BASOPHILS NFR BLD AUTO: 0.4 %
BILIRUBIN DIRECT+TOT PNL SERPL-MCNC: 0.3 MG/DL
BUN SERPL-MCNC: 8.3 MG/DL (ref 9.8–20.1)
CALCIUM SERPL-MCNC: 9.6 MG/DL (ref 8.4–10.2)
CHLORIDE SERPL-SCNC: 109 MMOL/L (ref 98–107)
CO2 SERPL-SCNC: 24 MMOL/L (ref 22–29)
CREAT SERPL-MCNC: 0.72 MG/DL (ref 0.55–1.02)
EOSINOPHIL # BLD AUTO: 0.16 X10(3)/MCL (ref 0–0.9)
EOSINOPHIL NFR BLD AUTO: 2.2 %
ERYTHROCYTE [DISTWIDTH] IN BLOOD BY AUTOMATED COUNT: 13.5 % (ref 11.5–17)
ETHANOL SERPL-MCNC: <10 MG/DL
GFR SERPLBLD CREATININE-BSD FMLA CKD-EPI: >60 MLS/MIN/1.73/M2
GLOBULIN SER-MCNC: 3 GM/DL (ref 2.4–3.5)
GLUCOSE SERPL-MCNC: 95 MG/DL (ref 74–100)
GROUP & RH: NORMAL
HCT VFR BLD AUTO: 40.8 % (ref 37–47)
HGB BLD-MCNC: 13.3 GM/DL (ref 12–16)
IMM GRANULOCYTES # BLD AUTO: 0.05 X10(3)/MCL (ref 0–0.04)
IMM GRANULOCYTES NFR BLD AUTO: 0.7 %
INDIRECT COOMBS GEL: NORMAL
INR BLD: 0.97 (ref 0–1.3)
LACTATE SERPL-SCNC: 0.9 MMOL/L (ref 0.5–2.2)
LYMPHOCYTES # BLD AUTO: 3.31 X10(3)/MCL (ref 0.6–4.6)
LYMPHOCYTES NFR BLD AUTO: 44.5 %
MCH RBC QN AUTO: 30.5 PG (ref 27–31)
MCHC RBC AUTO-ENTMCNC: 32.6 MG/DL (ref 33–36)
MCV RBC AUTO: 93.6 FL (ref 80–94)
MONOCYTES # BLD AUTO: 0.52 X10(3)/MCL (ref 0.1–1.3)
MONOCYTES NFR BLD AUTO: 7 %
NEUTROPHILS # BLD AUTO: 3.4 X10(3)/MCL (ref 2.1–9.2)
NEUTROPHILS NFR BLD AUTO: 45.2 %
NRBC BLD AUTO-RTO: 0 %
PLATELET # BLD AUTO: 286 X10(3)/MCL (ref 130–400)
PMV BLD AUTO: 9.5 FL (ref 7.4–10.4)
POTASSIUM SERPL-SCNC: 3.8 MMOL/L (ref 3.5–5.1)
PROT SERPL-MCNC: 7.1 GM/DL (ref 6.4–8.3)
PROTHROMBIN TIME: 12.8 SECONDS (ref 12.5–14.5)
RBC # BLD AUTO: 4.36 X10(6)/MCL (ref 4.2–5.4)
SODIUM SERPL-SCNC: 141 MMOL/L (ref 136–145)
WBC # SPEC AUTO: 7.4 X10(3)/MCL (ref 4.5–11.5)

## 2022-08-12 PROCEDURE — 99285 EMERGENCY DEPT VISIT HI MDM: CPT | Mod: 25

## 2022-08-12 PROCEDURE — 85025 COMPLETE CBC W/AUTO DIFF WBC: CPT | Performed by: STUDENT IN AN ORGANIZED HEALTH CARE EDUCATION/TRAINING PROGRAM

## 2022-08-12 PROCEDURE — 90715 TDAP VACCINE 7 YRS/> IM: CPT | Performed by: STUDENT IN AN ORGANIZED HEALTH CARE EDUCATION/TRAINING PROGRAM

## 2022-08-12 PROCEDURE — 36415 COLL VENOUS BLD VENIPUNCTURE: CPT | Performed by: STUDENT IN AN ORGANIZED HEALTH CARE EDUCATION/TRAINING PROGRAM

## 2022-08-12 PROCEDURE — 63600175 PHARM REV CODE 636 W HCPCS: Performed by: STUDENT IN AN ORGANIZED HEALTH CARE EDUCATION/TRAINING PROGRAM

## 2022-08-12 PROCEDURE — 85610 PROTHROMBIN TIME: CPT | Performed by: STUDENT IN AN ORGANIZED HEALTH CARE EDUCATION/TRAINING PROGRAM

## 2022-08-12 PROCEDURE — 86901 BLOOD TYPING SEROLOGIC RH(D): CPT | Performed by: STUDENT IN AN ORGANIZED HEALTH CARE EDUCATION/TRAINING PROGRAM

## 2022-08-12 PROCEDURE — 85730 THROMBOPLASTIN TIME PARTIAL: CPT | Performed by: STUDENT IN AN ORGANIZED HEALTH CARE EDUCATION/TRAINING PROGRAM

## 2022-08-12 PROCEDURE — 80053 COMPREHEN METABOLIC PANEL: CPT | Performed by: STUDENT IN AN ORGANIZED HEALTH CARE EDUCATION/TRAINING PROGRAM

## 2022-08-12 PROCEDURE — 83605 ASSAY OF LACTIC ACID: CPT | Performed by: STUDENT IN AN ORGANIZED HEALTH CARE EDUCATION/TRAINING PROGRAM

## 2022-08-12 PROCEDURE — G0390 TRAUMA RESPONS W/HOSP CRITI: HCPCS

## 2022-08-12 PROCEDURE — 82077 ASSAY SPEC XCP UR&BREATH IA: CPT | Performed by: STUDENT IN AN ORGANIZED HEALTH CARE EDUCATION/TRAINING PROGRAM

## 2022-08-12 PROCEDURE — 90471 IMMUNIZATION ADMIN: CPT | Performed by: STUDENT IN AN ORGANIZED HEALTH CARE EDUCATION/TRAINING PROGRAM

## 2022-08-12 RX ADMIN — TETANUS TOXOID, REDUCED DIPHTHERIA TOXOID AND ACELLULAR PERTUSSIS VACCINE, ADSORBED 0.5 ML: 5; 2.5; 8; 8; 2.5 SUSPENSION INTRAMUSCULAR at 01:08
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
## 2022-12-21 ENCOUNTER — OFFICE VISIT (OUTPATIENT)
Dept: URGENT CARE | Facility: CLINIC | Age: 52
End: 2022-12-21
Payer: COMMERCIAL

## 2022-12-21 VITALS
HEART RATE: 74 BPM | SYSTOLIC BLOOD PRESSURE: 127 MMHG | HEIGHT: 60 IN | TEMPERATURE: 98 F | OXYGEN SATURATION: 99 % | WEIGHT: 153.88 LBS | BODY MASS INDEX: 30.21 KG/M2 | RESPIRATION RATE: 20 BRPM | DIASTOLIC BLOOD PRESSURE: 83 MMHG

## 2022-12-21 DIAGNOSIS — R52 BODY ACHES: Primary | ICD-10-CM

## 2022-12-21 LAB
FLUAV AG UPPER RESP QL IA.RAPID: NOT DETECTED
FLUBV AG UPPER RESP QL IA.RAPID: NOT DETECTED
SARS-COV-2 RNA RESP QL NAA+PROBE: NOT DETECTED

## 2022-12-21 PROCEDURE — 99215 OFFICE O/P EST HI 40 MIN: CPT | Mod: PBBFAC | Performed by: FAMILY MEDICINE

## 2022-12-21 PROCEDURE — 0240U COVID/FLU A&B PCR: CPT | Performed by: FAMILY MEDICINE

## 2022-12-21 PROCEDURE — 99203 OFFICE O/P NEW LOW 30 MIN: CPT | Mod: S$PBB,,, | Performed by: FAMILY MEDICINE

## 2022-12-21 PROCEDURE — 99203 PR OFFICE/OUTPT VISIT, NEW, LEVL III, 30-44 MIN: ICD-10-PCS | Mod: S$PBB,,, | Performed by: FAMILY MEDICINE

## 2022-12-21 RX ORDER — HYDROCODONE BITARTRATE AND ACETAMINOPHEN 10; 325 MG/1; MG/1
1 TABLET ORAL
COMMUNITY
Start: 2022-12-11

## 2022-12-21 RX ORDER — PANTOPRAZOLE SODIUM 20 MG/1
20 TABLET, DELAYED RELEASE ORAL
COMMUNITY
Start: 2022-10-30 | End: 2023-10-04 | Stop reason: ALTCHOICE

## 2022-12-21 RX ORDER — LAMOTRIGINE 100 MG/1
100 TABLET ORAL
COMMUNITY
Start: 2022-11-17 | End: 2023-10-04 | Stop reason: ALTCHOICE

## 2022-12-21 RX ORDER — TRAZODONE HYDROCHLORIDE 100 MG/1
100 TABLET ORAL NIGHTLY
COMMUNITY
Start: 2022-12-11 | End: 2023-10-04 | Stop reason: ALTCHOICE

## 2022-12-21 RX ORDER — VENLAFAXINE HYDROCHLORIDE 150 MG/1
150 CAPSULE, EXTENDED RELEASE ORAL
COMMUNITY
Start: 2022-10-05

## 2022-12-21 RX ORDER — AMLODIPINE BESYLATE 10 MG/1
1 TABLET ORAL DAILY
COMMUNITY
Start: 2022-11-29 | End: 2023-10-04 | Stop reason: ALTCHOICE

## 2022-12-21 RX ORDER — MELOXICAM 15 MG/1
15 TABLET ORAL
COMMUNITY
Start: 2022-09-30 | End: 2023-03-13

## 2022-12-21 NOTE — LETTER
December 21, 2022      [facility]  [address] [address]  Phone: [phone]       Patient: Yany Yuan   YOB: 1970  Date of Visit: 12/21/2022    To Whom It May Concern:    Margoth Yuan  was at [facility] on 12/21/2022. The patient may return to work/school on DEC 26 2022 with no restrictions. If you have any questions or concerns, or if I can be of further assistance, please do not hesitate to contact me.    Sincerely,    TARUN LYNCH MD

## 2022-12-22 NOTE — PROGRESS NOTES
"Subjective:       Patient ID: Yany Yuan is a 52 y.o. female.    Vitals:  height is 4' 11.84" (1.52 m) and weight is 69.8 kg (153 lb 14.1 oz). Her temperature is 98.1 °F (36.7 °C). Her blood pressure is 127/83 and her pulse is 74. Her respiration is 20 and oxygen saturation is 99%.     Chief Complaint: Otalgia and Generalized Body Aches (X 3days)    Otalgia       Patient with 3 days of myalgias, minimal cough, clear rhinorrhea.  No sore throat or difficulty swallowing.  No fever.  Potential remote influenza exposure.  History of chronic ear symptoms, occasional pain, occasional clear drainage.    HENT:  Positive for ear pain.      Constitutional: negative except as stated in HPI  Eye: negative except as stated in HPI  ENT: negative except as stated in HPI  Respiratory: negative except as stated in HPI  Cardiovascular: negative except as stated in HPI  Gastrointestinal: negative except as stated in HPI  Genitourinary: negative except as stated in HPI  Objective:      Physical Exam   Constitutional: She appears well-developed.   HENT:   Head: Atraumatic.   Ears:   Right Ear: Ear canal normal.   Left Ear: Tympanic membrane and ear canal normal.      Comments: Right TM is somewhat cloudy, no erythema or bulging.  EAC is clear  Nose: Rhinorrhea present. No purulent discharge. Right sinus exhibits no maxillary sinus tenderness and no frontal sinus tenderness. Left sinus exhibits no maxillary sinus tenderness and no frontal sinus tenderness.   Eyes: Right eye exhibits no discharge. Left eye exhibits no discharge. Extraocular movement intact   Neck: Neck supple.   Cardiovascular: Regular rhythm.   Pulmonary/Chest: Effort normal and breath sounds normal. No respiratory distress. She has no wheezes. She has no rales.   Lymphadenopathy:     She has no cervical adenopathy.   Neurological: She is alert.   Skin: Skin is warm and dry.   Psychiatric: Her behavior is normal.   Nursing note and vitals reviewed.      Assessment: "       1. Body aches            Plan:         Body aches  -     COVID/FLU A&B PCR    Will notify of any positive PCR results and treat accordingly.  Encouraged PCP follow-up for chronic right ear symptoms.  Avoid hydrogen peroxide

## 2022-12-31 ENCOUNTER — HOSPITAL ENCOUNTER (EMERGENCY)
Facility: HOSPITAL | Age: 52
Discharge: HOME | End: 2022-12-31
Attending: EMERGENCY MEDICINE
Payer: COMMERCIAL

## 2022-12-31 VITALS
RESPIRATION RATE: 16 BRPM | TEMPERATURE: 98 F | HEIGHT: 59 IN | BODY MASS INDEX: 31.25 KG/M2 | WEIGHT: 155 LBS | OXYGEN SATURATION: 100 % | DIASTOLIC BLOOD PRESSURE: 80 MMHG | HEART RATE: 76 BPM | SYSTOLIC BLOOD PRESSURE: 125 MMHG

## 2022-12-31 DIAGNOSIS — S39.012A LOW BACK STRAIN, INITIAL ENCOUNTER: ICD-10-CM
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
## 2022-12-31 DIAGNOSIS — S16.1XXA STRAIN OF NECK MUSCLE, INITIAL ENCOUNTER: ICD-10-CM

## 2022-12-31 PROCEDURE — 99284 EMERGENCY DEPT VISIT MOD MDM: CPT | Mod: 25

## 2022-12-31 PROCEDURE — 25000003 PHARM REV CODE 250: Performed by: PHYSICIAN ASSISTANT

## 2022-12-31 RX ORDER — CYCLOBENZAPRINE HCL 10 MG
10 TABLET ORAL
Status: COMPLETED | OUTPATIENT
Start: 2022-12-31 | End: 2022-12-31

## 2022-12-31 RX ORDER — HYDROCODONE BITARTRATE AND ACETAMINOPHEN 7.5; 325 MG/1; MG/1
1 TABLET ORAL ONCE
Status: COMPLETED | OUTPATIENT
Start: 2022-12-31 | End: 2022-12-31

## 2022-12-31 RX ADMIN — CYCLOBENZAPRINE 10 MG: 10 TABLET, FILM COATED ORAL at 03:12

## 2022-12-31 RX ADMIN — HYDROCODONE BITARTRATE AND ACETAMINOPHEN 1 TABLET: 7.5; 325 TABLET ORAL at 03:12
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
## 2022-12-31 NOTE — DISCHARGE INSTRUCTIONS
Report to Emergency Department if symptoms return or worsen; [facility] Within 1 to 2 days, It is important that you follow up with your primary care provider or specialist if indicated for further evaluation, workup, and treatment as necessary. The exam and treatment you received in Emergency Department was for an urgent problem and NOT INTENDED AS COMPLETE CARE. It is important that you FOLLOW UP with a doctor for ongoing care. If your symptoms become WORSE or you DO NOT IMPROVE and you are unable to reach your health care provider, you should RETURN to the Emergency Department. The Emergency Department provider has provided a PRELIMINARY INTERPRETATION of all your studies. A final interpretation may be done after you are discharged. If a change in your diagnosis or treatment is needed WE WILL CONTACT YOU. It is critical that we have a CURRENT PHONE NUMBER FOR YOU.

## 2023-01-25 ENCOUNTER — OFFICE VISIT (OUTPATIENT)
Dept: URGENT CARE | Facility: CLINIC | Age: 53
End: 2023-01-25
Payer: COMMERCIAL

## 2023-01-25 VITALS
RESPIRATION RATE: 18 BRPM | SYSTOLIC BLOOD PRESSURE: 156 MMHG | OXYGEN SATURATION: 98 % | HEIGHT: 59 IN | DIASTOLIC BLOOD PRESSURE: 90 MMHG | WEIGHT: 149 LBS | HEART RATE: 78 BPM | BODY MASS INDEX: 30.04 KG/M2

## 2023-01-25 DIAGNOSIS — T14.8XXA BRUISING: Primary | ICD-10-CM

## 2023-01-25 LAB
APTT PPP: 32.7 SECONDS
BASOPHILS # BLD AUTO: 0.03 X10(3)/MCL (ref 0–0.2)
BASOPHILS NFR BLD AUTO: 0.4 %
EOSINOPHIL # BLD AUTO: 0.09 X10(3)/MCL (ref 0–0.9)
EOSINOPHIL NFR BLD AUTO: 1.1 %
ERYTHROCYTE [DISTWIDTH] IN BLOOD BY AUTOMATED COUNT: 13 % (ref 11.5–17)
HCT VFR BLD AUTO: 40.1 % (ref 37–47)
HGB BLD-MCNC: 13.1 GM/DL (ref 12–16)
IMM GRANULOCYTES # BLD AUTO: 0.02 X10(3)/MCL (ref 0–0.04)
IMM GRANULOCYTES NFR BLD AUTO: 0.3 %
LYMPHOCYTES # BLD AUTO: 2.22 X10(3)/MCL (ref 0.6–4.6)
LYMPHOCYTES NFR BLD AUTO: 27.9 %
MCH RBC QN AUTO: 29.3 PG
MCHC RBC AUTO-ENTMCNC: 32.7 MG/DL (ref 33–36)
MCV RBC AUTO: 89.7 FL (ref 80–94)
MONOCYTES # BLD AUTO: 0.4 X10(3)/MCL (ref 0.1–1.3)
MONOCYTES NFR BLD AUTO: 5 %
NEUTROPHILS # BLD AUTO: 5.2 X10(3)/MCL (ref 2.1–9.2)
NEUTROPHILS NFR BLD AUTO: 65.3 %
NRBC BLD AUTO-RTO: 0 %
PLATELET # BLD AUTO: 396 X10(3)/MCL (ref 130–400)
PMV BLD AUTO: 8.5 FL (ref 7.4–10.4)
RBC # BLD AUTO: 4.47 X10(6)/MCL (ref 4.2–5.4)
WBC # SPEC AUTO: 8 X10(3)/MCL (ref 4.5–11.5)

## 2023-01-25 PROCEDURE — 85610 PROTHROMBIN TIME: CPT | Performed by: FAMILY MEDICINE

## 2023-01-25 PROCEDURE — 85730 THROMBOPLASTIN TIME PARTIAL: CPT | Performed by: FAMILY MEDICINE

## 2023-01-25 PROCEDURE — 99214 OFFICE O/P EST MOD 30 MIN: CPT | Mod: S$PBB,,, | Performed by: FAMILY MEDICINE

## 2023-01-25 PROCEDURE — 36415 COLL VENOUS BLD VENIPUNCTURE: CPT | Performed by: FAMILY MEDICINE

## 2023-01-25 PROCEDURE — 99214 PR OFFICE/OUTPT VISIT, EST, LEVL IV, 30-39 MIN: ICD-10-PCS | Mod: S$PBB,,, | Performed by: FAMILY MEDICINE

## 2023-01-25 PROCEDURE — 99214 OFFICE O/P EST MOD 30 MIN: CPT | Mod: PBBFAC | Performed by: FAMILY MEDICINE

## 2023-01-25 PROCEDURE — 85025 COMPLETE CBC W/AUTO DIFF WBC: CPT | Performed by: FAMILY MEDICINE

## 2023-01-26 NOTE — PROGRESS NOTES
"Subjective:       Patient ID: Yany Yuan is a 52 y.o. female.    Chief Complaint: Skin Problem (Bruises to bilateral legs x 1 week, denies pain)      HPI  53 yo female with bruising to bilateral lateral thighs for 1 week.  Denies injuries, falls, and pain.  Denies new medications or recent illnesses.    Review of Systems   Hematological:         As above       Objective:       Vital Signs  Pulse: 78  Resp: 18  SpO2: 98 %  BP: (!) 156/90  BP Location: Right arm  Patient Position: Sitting  Height and Weight  Height: 4' 11" (149.9 cm)  Weight: 67.6 kg (149 lb)  BSA (Calculated - sq m): 1.68 sq meters  BMI (Calculated): 30.1  Weight in (lb) to have BMI = 25: 123.5]  Physical Exam  Vitals reviewed.   Constitutional:       Appearance: Normal appearance.   HENT:      Head: Normocephalic and atraumatic.   Eyes:      Extraocular Movements: Extraocular movements intact.      Conjunctiva/sclera: Conjunctivae normal.   Cardiovascular:      Rate and Rhythm: Normal rate and regular rhythm.      Heart sounds: Normal heart sounds.   Pulmonary:      Breath sounds: Normal breath sounds.   Skin:            Comments: Contusion in areas denoted in red.  No TTP, heat, indurations, or exudate.    Neurological:      General: No focal deficit present.      Mental Status: She is alert.   Psychiatric:         Mood and Affect: Mood and affect normal.         Speech: Speech normal.         Behavior: Behavior normal. Behavior is cooperative.         Thought Content: Thought content does not include homicidal or suicidal ideation.       Assessment:       Problem List Items Addressed This Visit    None  Visit Diagnoses       Bruising    -  Primary    Relevant Orders    APTT    Protime-INR    CBC Auto Differential              Plan:   Avoid NSAIDS until labs return  ER precautions  FU with PCP   "

## 2023-01-29 ENCOUNTER — TELEPHONE (OUTPATIENT)
Dept: URGENT CARE | Facility: CLINIC | Age: 53
End: 2023-01-29
Payer: COMMERCIAL

## 2023-01-29 NOTE — TELEPHONE ENCOUNTER
----- Message from Deric Guerrero MD sent at 1/26/2023  9:22 AM CST -----  Labs are very reassuring.  Please, tell patient to FU with her PCP. Thanks

## 2023-03-13 ENCOUNTER — HOSPITAL ENCOUNTER (EMERGENCY)
Facility: HOSPITAL | Age: 53
Discharge: HOME | End: 2023-03-13
Attending: EMERGENCY MEDICINE
Payer: COMMERCIAL

## 2023-03-13 VITALS
HEART RATE: 101 BPM | DIASTOLIC BLOOD PRESSURE: 72 MMHG | TEMPERATURE: 99 F | RESPIRATION RATE: 18 BRPM | BODY MASS INDEX: 29.23 KG/M2 | OXYGEN SATURATION: 100 % | SYSTOLIC BLOOD PRESSURE: 122 MMHG | WEIGHT: 145 LBS | HEIGHT: 59 IN

## 2023-03-13 DIAGNOSIS — M25.551 RIGHT HIP PAIN: Primary | ICD-10-CM

## 2023-03-13 DIAGNOSIS — R52 PAIN: ICD-10-CM

## 2023-03-13 PROCEDURE — 96372 THER/PROPH/DIAG INJ SC/IM: CPT | Performed by: EMERGENCY MEDICINE

## 2023-03-13 PROCEDURE — 63600175 PHARM REV CODE 636 W HCPCS: Performed by: EMERGENCY MEDICINE

## 2023-03-13 PROCEDURE — 99284 EMERGENCY DEPT VISIT MOD MDM: CPT

## 2023-03-13 RX ORDER — NAPROXEN 500 MG/1
500 TABLET ORAL 2 TIMES DAILY WITH MEALS
Qty: 20 TABLET | Refills: 0 | Status: SHIPPED | OUTPATIENT
Start: 2023-03-13 | End: 2023-10-04 | Stop reason: ALTCHOICE

## 2023-03-13 RX ORDER — DEXAMETHASONE SODIUM PHOSPHATE 4 MG/ML
8 INJECTION, SOLUTION INTRA-ARTICULAR; INTRALESIONAL; INTRAMUSCULAR; INTRAVENOUS; SOFT TISSUE
Status: COMPLETED | OUTPATIENT
Start: 2023-03-13 | End: 2023-03-13

## 2023-03-13 RX ADMIN — DEXAMETHASONE SODIUM PHOSPHATE 8 MG: 4 INJECTION, SOLUTION INTRA-ARTICULAR; INTRALESIONAL; INTRAMUSCULAR; INTRAVENOUS; SOFT TISSUE at 10:03

## 2023-03-13 NOTE — Clinical Note
"Yany Andinoi" Kwabena was seen and treated in our emergency department on 3/13/2023.  She may return to work on 03/14/2023.       If you have any questions or concerns, please don't hesitate to call.      DELFINA JERONIMO    "
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
## 2023-03-19 ENCOUNTER — OFFICE VISIT (OUTPATIENT)
Dept: URGENT CARE | Facility: CLINIC | Age: 53
End: 2023-03-19
Payer: COMMERCIAL

## 2023-03-19 VITALS
BODY MASS INDEX: 29.23 KG/M2 | HEART RATE: 92 BPM | TEMPERATURE: 100 F | HEIGHT: 59 IN | DIASTOLIC BLOOD PRESSURE: 76 MMHG | SYSTOLIC BLOOD PRESSURE: 117 MMHG | RESPIRATION RATE: 20 BRPM | WEIGHT: 145 LBS | OXYGEN SATURATION: 97 %

## 2023-03-19 DIAGNOSIS — R10.11 RIGHT UPPER QUADRANT ABDOMINAL PAIN: Primary | ICD-10-CM

## 2023-03-19 DIAGNOSIS — R68.83 CHILLS: ICD-10-CM

## 2023-03-19 LAB
ALBUMIN SERPL-MCNC: 3 G/DL (ref 3.5–5)
ALBUMIN/GLOB SERPL: 0.7 RATIO (ref 1.1–2)
ALP SERPL-CCNC: 169 UNIT/L (ref 40–150)
ALT SERPL-CCNC: 58 UNIT/L (ref 0–55)
AMYLASE SERPL-CCNC: 18 UNIT/L (ref 25–125)
AST SERPL-CCNC: 69 UNIT/L (ref 5–34)
BILIRUBIN DIRECT+TOT PNL SERPL-MCNC: 0.5 MG/DL
BUN SERPL-MCNC: 8.3 MG/DL (ref 9.8–20.1)
CALCIUM SERPL-MCNC: 10 MG/DL (ref 8.4–10.2)
CHLORIDE SERPL-SCNC: 102 MMOL/L (ref 98–107)
CO2 SERPL-SCNC: 25 MMOL/L (ref 22–29)
CREAT SERPL-MCNC: 0.82 MG/DL (ref 0.55–1.02)
CTP QC/QA: YES
CTP QC/QA: YES
GFR SERPLBLD CREATININE-BSD FMLA CKD-EPI: >60 MLS/MIN/1.73/M2
GLOBULIN SER-MCNC: 4.2 GM/DL (ref 2.4–3.5)
GLUCOSE SERPL-MCNC: 96 MG/DL (ref 74–100)
LIPASE SERPL-CCNC: 4 U/L
POC MOLECULAR INFLUENZA A AGN: NEGATIVE
POC MOLECULAR INFLUENZA B AGN: NEGATIVE
POTASSIUM SERPL-SCNC: 3.7 MMOL/L (ref 3.5–5.1)
PROT SERPL-MCNC: 7.2 GM/DL (ref 6.4–8.3)
SARS-COV-2 RDRP RESP QL NAA+PROBE: NEGATIVE
SODIUM SERPL-SCNC: 138 MMOL/L (ref 136–145)

## 2023-03-19 PROCEDURE — 87635 SARS-COV-2 COVID-19 AMP PRB: CPT | Mod: QW,,, | Performed by: FAMILY MEDICINE

## 2023-03-19 PROCEDURE — 36415 COLL VENOUS BLD VENIPUNCTURE: CPT | Performed by: FAMILY MEDICINE

## 2023-03-19 PROCEDURE — 99213 OFFICE O/P EST LOW 20 MIN: CPT | Mod: ,,, | Performed by: FAMILY MEDICINE

## 2023-03-19 PROCEDURE — 87502 INFLUENZA DNA AMP PROBE: CPT | Mod: QW,,, | Performed by: FAMILY MEDICINE

## 2023-03-19 PROCEDURE — 99213 PR OFFICE/OUTPT VISIT, EST, LEVL III, 20-29 MIN: ICD-10-PCS | Mod: ,,, | Performed by: FAMILY MEDICINE

## 2023-03-19 PROCEDURE — 87635: ICD-10-PCS | Mod: QW,,, | Performed by: FAMILY MEDICINE

## 2023-03-19 PROCEDURE — 83690 ASSAY OF LIPASE: CPT | Performed by: FAMILY MEDICINE

## 2023-03-19 PROCEDURE — 87502 POCT INFLUENZA A/B MOLECULAR: ICD-10-PCS | Mod: QW,,, | Performed by: FAMILY MEDICINE

## 2023-03-19 PROCEDURE — 80053 COMPREHEN METABOLIC PANEL: CPT | Performed by: FAMILY MEDICINE

## 2023-03-19 PROCEDURE — 82150 ASSAY OF AMYLASE: CPT | Performed by: FAMILY MEDICINE

## 2023-03-19 RX ORDER — TRAZODONE HYDROCHLORIDE 50 MG/1
50 TABLET ORAL
COMMUNITY
Start: 2023-03-08 | End: 2023-10-04 | Stop reason: ALTCHOICE

## 2023-03-19 RX ORDER — CARIPRAZINE 1.5 MG/1
1.5 CAPSULE, GELATIN COATED ORAL
COMMUNITY
Start: 2023-03-18 | End: 2023-10-04 | Stop reason: ALTCHOICE

## 2023-03-19 NOTE — PROGRESS NOTES
"Subjective:       Patient ID: Yany Yuan is a 52 y.o. female.    Vitals:  height is 4' 11" (1.499 m) and weight is 65.8 kg (145 lb). Her temperature is 99.5 °F (37.5 °C). Her blood pressure is 117/76 and her pulse is 92. Her respiration is 20 and oxygen saturation is 97%.     Chief Complaint: Chills (Fever: 102.0 x this morning /body aches, sharp pain on right side, chills x November )    HPI:  52-year-old female known for multiple medical condition follows up with primary MD Dr. Navarro.  Patient states similar complaints at least once a week since November 2022.  In the chart last seen by primary MD Feb 2023.  Right upper quadrant abdominal pain radiating to back at same level.  No visible rash.  States last year in July has taken increase dose of Tylenol and possibly affected her liver.  No fever in the clinic.  Has not taken any medication to reduce the fever.  Temp in the clinic 99.5.    ROS  :  Constitutional : _ fever , Body aches, Chills  HENT_sore throat, postnasal drainage  Respiratory_no wheezing, no shortness of breath, as per HPI  Cardiovascular_no chest pain  Gastrointestinal_ No vomiting, No diarrhea, No abdominal pain  Musculoskeletal_no joint pain, no joint swelling  Integumentary_no skin rash     Objective:      Physical Exam    General : Alert and Oriented, No apparent distress, afebrile, appears comfortable sitting on exam table, clear speech and appropriate communication, laughing and talking with her cousin  Neck - supple  HENT : Oropharynx no redness or swelling.  Bilateral TMs intact mild fluid no redness.   Respiratory : Bilateral equal breath sounds, nonlabored respirations  Cardiovascular : Rate, rhythm regular, normal volume pulse, no murmur  Gastrointestinal: Full abdomen, soft, no visible bruising or swelling.  Bowel sounds present all 4 quadrants, right upper quadrant, right back below the ribcage tender to palpate.  No guarding or rigidity.  No rebound tenderness.  Integumentary : " Warm, Dry and no rash    Assessment:       1. Right upper quadrant abdominal pain    2. Chills       Plan:     Discussed the physical findings, differential diagnosis as well.  Lab results will be monitored on reported.  ER precautions with any acute change in symptoms  With new onset fever and body aches, COVID-19 test negative, flu test negative  Encouraged to monitor the symptoms, adequate hydration and rest.  Call or return to clinic for any questions.    With ongoing symptoms on and off patient will follow up with primary MD    Right upper quadrant abdominal pain  -     Comprehensive Metabolic Panel  -     Lipase; Future; Expected date: 03/19/2023  -     Amylase; Future; Expected date: 03/19/2023    Chills  -     POCT Influenza A/B MOLECULAR  -     POCT COVID-19 Rapid Screening

## 2023-03-19 NOTE — PATIENT INSTRUCTIONS
Discussed the physical findings, differential diagnosis as well.  Lab results will be monitored on reported.  ER precautions with any acute change in symptoms  With new onset fever and body aches, COVID-19 test negative, flu test negative  Encouraged to monitor the symptoms, adequate hydration and rest.  Call or return to clinic for any questions.    With ongoing symptoms on and off patient will follow up with primary MD

## 2023-08-16 LAB
INR PPP: 1
PROTHROMBIN TIME: 13.2 SECONDS (ref 11.4–14)

## 2023-10-04 ENCOUNTER — OFFICE VISIT (OUTPATIENT)
Dept: URGENT CARE | Facility: CLINIC | Age: 53
End: 2023-10-04
Payer: MEDICAID

## 2023-10-04 VITALS
WEIGHT: 150 LBS | OXYGEN SATURATION: 100 % | BODY MASS INDEX: 30.24 KG/M2 | TEMPERATURE: 98 F | SYSTOLIC BLOOD PRESSURE: 150 MMHG | HEIGHT: 59 IN | RESPIRATION RATE: 18 BRPM | HEART RATE: 74 BPM | DIASTOLIC BLOOD PRESSURE: 80 MMHG

## 2023-10-04 DIAGNOSIS — N89.8 VAGINAL DISCHARGE: ICD-10-CM

## 2023-10-04 DIAGNOSIS — N30.91 CYSTITIS WITH HEMATURIA: Primary | ICD-10-CM

## 2023-10-04 LAB
BILIRUB UR QL STRIP: NEGATIVE
CLUE CELLS VAG QL WET PREP: ABNORMAL
CTP QC/QA: YES
CTP QC/QA: YES
GLUCOSE UR QL STRIP: NEGATIVE
KETONES UR QL STRIP: NEGATIVE
LEUKOCYTE ESTERASE UR QL STRIP: POSITIVE
MOLECULAR STREP A: NEGATIVE
PH, POC UA: 6
POC BLOOD, URINE: POSITIVE
POC NITRATES, URINE: POSITIVE
PROT UR QL STRIP: POSITIVE
SARS-COV-2 RDRP RESP QL NAA+PROBE: NEGATIVE
SP GR UR STRIP: >=1.03 (ref 1–1.03)
T VAGINALIS VAG QL WET PREP: ABNORMAL
UROBILINOGEN UR STRIP-ACNC: 0.2 (ref 0.1–1.1)
WBC #/AREA VAG WET PREP: ABNORMAL
YEAST SPEC QL WET PREP: ABNORMAL

## 2023-10-04 PROCEDURE — 87088 URINE BACTERIA CULTURE: CPT

## 2023-10-04 PROCEDURE — 87635 SARS-COV-2 COVID-19 AMP PRB: CPT | Mod: PBBFAC

## 2023-10-04 PROCEDURE — 99215 OFFICE O/P EST HI 40 MIN: CPT | Mod: PBBFAC

## 2023-10-04 PROCEDURE — 87651 STREP A DNA AMP PROBE: CPT | Mod: PBBFAC

## 2023-10-04 PROCEDURE — 87210 SMEAR WET MOUNT SALINE/INK: CPT

## 2023-10-04 PROCEDURE — 99214 OFFICE O/P EST MOD 30 MIN: CPT | Mod: S$PBB,,,

## 2023-10-04 PROCEDURE — 81003 URINALYSIS AUTO W/O SCOPE: CPT | Mod: PBBFAC

## 2023-10-04 PROCEDURE — 99214 PR OFFICE/OUTPT VISIT, EST, LEVL IV, 30-39 MIN: ICD-10-PCS | Mod: S$PBB,,,

## 2023-10-04 PROCEDURE — 87077 CULTURE AEROBIC IDENTIFY: CPT

## 2023-10-04 RX ORDER — NITROFURANTOIN 25; 75 MG/1; MG/1
100 CAPSULE ORAL 2 TIMES DAILY
Qty: 14 CAPSULE | Refills: 0 | Status: SHIPPED | OUTPATIENT
Start: 2023-10-04 | End: 2023-10-11

## 2023-10-04 RX ORDER — CARIPRAZINE 1.5 MG/1
CAPSULE, GELATIN COATED ORAL
COMMUNITY
Start: 2023-03-01 | End: 2023-10-04 | Stop reason: ALTCHOICE

## 2023-10-04 RX ORDER — LAMOTRIGINE 200 MG/1
200 TABLET ORAL
COMMUNITY
Start: 2023-08-27

## 2023-10-04 RX ORDER — VENLAFAXINE HYDROCHLORIDE 75 MG/1
75 CAPSULE, EXTENDED RELEASE ORAL EVERY MORNING
COMMUNITY
Start: 2023-08-27

## 2023-10-04 RX ORDER — CARIPRAZINE 3 MG/1
3 CAPSULE, GELATIN COATED ORAL EVERY MORNING
COMMUNITY
Start: 2023-07-10 | End: 2023-10-04 | Stop reason: ALTCHOICE

## 2023-10-04 NOTE — LETTER
October 4, 2023      [facility]  [facility] [facility] [address]  Phone: [phone]       Patient: Yany Yuan   YOB: 1970  Date of Visit: 10/04/2023    To Whom It May Concern:    Margoth Yuan  was at [facility] on 10/04/2023. The patient may return to work/school on 10/05/2023 with no restrictions. If you have any questions or concerns, or if I can be of further assistance, please do not hesitate to contact me.    Sincerely,      KEVIN Mckeon

## 2023-10-04 NOTE — PROGRESS NOTES
"Subjective:      Patient ID: Yany Yuan is a 53 y.o. female.    Vitals:  height is 4' 11" (1.499 m) and weight is 68 kg (150 lb). Her oral temperature is 97.6 °F (36.4 °C). Her blood pressure is 150/80 (abnormal) and her pulse is 74. Her respiration is 18 and oxygen saturation is 100%.     Chief Complaint: Otalgia (Nausea x 4 days) and Vaginal Discharge    Cc as above. States she has a yellowish discharge. Denies any vaginal irritation or itching.     Nausea  This is a new problem. The current episode started in the past 7 days. The problem occurs intermittently. The problem has been unchanged. Associated symptoms include nausea. Pertinent negatives include no abdominal pain or vomiting. She has tried nothing for the symptoms.       Gastrointestinal:  Positive for nausea. Negative for abdominal pain and vomiting.      Objective:     Physical Exam   Constitutional: She is oriented to person, place, and time. normal  HENT:   Head: Normocephalic and atraumatic.   Nose: Nose normal.   Eyes: Conjunctivae are normal.   Neck: Neck supple.   Cardiovascular: Normal rate, regular rhythm, normal heart sounds and normal pulses.   Pulmonary/Chest: Effort normal and breath sounds normal.   Abdominal: Normal appearance and bowel sounds are normal. She exhibits no distension. Soft. There is no abdominal tenderness. There is no rebound, no guarding, no left CVA tenderness and no right CVA tenderness.   Musculoskeletal: Normal range of motion.         General: Normal range of motion.   Neurological: no focal deficit. She is alert and oriented to person, place, and time.   Skin: Skin is warm and dry.   Psychiatric: Her behavior is normal. Mood and thought content normal.   Nursing note and vitals reviewed.      Assessment:     1. Otalgia, unspecified laterality    2. Nausea    3. Vaginal discharge      Results for orders placed or performed in visit on 10/04/23   POCT Urinalysis, Dipstick, Automated, W/O Scope   Result Value Ref " Range    POC Blood, Urine Positive (A) Negative    POC Bilirubin, Urine Negative Negative    POC Urobilinogen, Urine 0.2 0.1 - 1.1    POC Ketones, Urine Negative Negative    POC Protein, Urine Positive (A) Negative    POC Nitrates, Urine Positive (A) Negative    POC Glucose, Urine Negative Negative    pH, UA 6.0     POC Specific Gravity, Urine >=1.030 1.003 - 1.029    POC Leukocytes, Urine Positive (A) Negative   POCT COVID-19 Rapid Screening   Result Value Ref Range    POC Rapid COVID Negative Negative     Acceptable Yes    POCT Strep A, Molecular   Result Value Ref Range    Molecular Strep A, POC Negative Negative     Acceptable Yes         Plan:       Otalgia, unspecified laterality  -     POCT Strep A, Molecular    Nausea  -     POCT COVID-19 Rapid Screening  -     POCT Strep A, Molecular    Vaginal discharge  -     POCT Urinalysis, Dipstick, Automated, W/O Scope      Drink plenty of water. Tylenol or motrin for pain and fever. Take antibiotics as prescribed - do not stop them early. If you need an antibiotic change, we will notify you in approximately 3 days. If you do not receive a call from us, continue the medication you received today. Er precautions provided.

## 2023-10-06 LAB — BACTERIA UR CULT: ABNORMAL

## 2023-11-01 ENCOUNTER — OFFICE VISIT (OUTPATIENT)
Dept: URGENT CARE | Facility: CLINIC | Age: 53
End: 2023-11-01
Payer: MEDICAID

## 2023-11-01 ENCOUNTER — TELEPHONE (OUTPATIENT)
Dept: URGENT CARE | Facility: CLINIC | Age: 53
End: 2023-11-01

## 2023-11-01 VITALS
OXYGEN SATURATION: 97 % | HEART RATE: 70 BPM | WEIGHT: 152 LBS | RESPIRATION RATE: 16 BRPM | TEMPERATURE: 98 F | BODY MASS INDEX: 30.64 KG/M2 | DIASTOLIC BLOOD PRESSURE: 99 MMHG | HEIGHT: 59 IN | SYSTOLIC BLOOD PRESSURE: 158 MMHG

## 2023-11-01 DIAGNOSIS — I10 ELEVATED BLOOD PRESSURE READING WITH DIAGNOSIS OF HYPERTENSION: ICD-10-CM

## 2023-11-01 DIAGNOSIS — N30.91 CYSTITIS WITH HEMATURIA: Primary | ICD-10-CM

## 2023-11-01 DIAGNOSIS — Z76.89 REFERRAL OF PATIENT: ICD-10-CM

## 2023-11-01 DIAGNOSIS — A59.9 TRICHIMONIASIS: ICD-10-CM

## 2023-11-01 DIAGNOSIS — N89.8 VAGINAL DISCHARGE: ICD-10-CM

## 2023-11-01 LAB
BILIRUB UR QL STRIP: NEGATIVE
CLUE CELLS VAG QL WET PREP: ABNORMAL
GLUCOSE UR QL STRIP: NEGATIVE
KETONES UR QL STRIP: NEGATIVE
LEUKOCYTE ESTERASE UR QL STRIP: POSITIVE
PH, POC UA: 7.5
POC BLOOD, URINE: POSITIVE
POC NITRATES, URINE: NEGATIVE
PROT UR QL STRIP: POSITIVE
SP GR UR STRIP: 1.02 (ref 1–1.03)
T VAGINALIS VAG QL WET PREP: ABNORMAL
UROBILINOGEN UR STRIP-ACNC: 0.2 (ref 0.1–1.1)
WBC #/AREA VAG WET PREP: ABNORMAL
YEAST SPEC QL WET PREP: ABNORMAL

## 2023-11-01 PROCEDURE — 87210 SMEAR WET MOUNT SALINE/INK: CPT

## 2023-11-01 PROCEDURE — 87086 URINE CULTURE/COLONY COUNT: CPT

## 2023-11-01 PROCEDURE — 99214 OFFICE O/P EST MOD 30 MIN: CPT | Mod: S$PBB,,,

## 2023-11-01 PROCEDURE — 81003 URINALYSIS AUTO W/O SCOPE: CPT | Mod: PBBFAC

## 2023-11-01 PROCEDURE — 99214 PR OFFICE/OUTPT VISIT, EST, LEVL IV, 30-39 MIN: ICD-10-PCS | Mod: S$PBB,,,

## 2023-11-01 PROCEDURE — 99215 OFFICE O/P EST HI 40 MIN: CPT | Mod: PBBFAC

## 2023-11-01 RX ORDER — AMLODIPINE BESYLATE 10 MG/1
10 TABLET ORAL
COMMUNITY
Start: 2023-10-23

## 2023-11-01 RX ORDER — MELOXICAM 15 MG/1
15 TABLET ORAL
COMMUNITY
Start: 2023-09-27 | End: 2024-03-18

## 2023-11-01 RX ORDER — AMOXICILLIN AND CLAVULANATE POTASSIUM 875; 125 MG/1; MG/1
1 TABLET, FILM COATED ORAL EVERY 12 HOURS
Qty: 14 TABLET | Refills: 0 | Status: SHIPPED | OUTPATIENT
Start: 2023-11-01 | End: 2023-11-08

## 2023-11-01 RX ORDER — METRONIDAZOLE 500 MG/1
2000 TABLET ORAL ONCE
Qty: 4 TABLET | Refills: 0 | Status: SHIPPED | OUTPATIENT
Start: 2023-11-01 | End: 2023-11-01

## 2023-11-01 RX ORDER — HYDROXYZINE PAMOATE 25 MG/1
25 CAPSULE ORAL 2 TIMES DAILY PRN
COMMUNITY
Start: 2023-10-30

## 2023-11-01 RX ORDER — ONDANSETRON 4 MG/1
4 TABLET, ORALLY DISINTEGRATING ORAL EVERY 6 HOURS PRN
Qty: 12 TABLET | Refills: 0 | Status: SHIPPED | OUTPATIENT
Start: 2023-11-01 | End: 2023-11-04

## 2023-11-01 NOTE — PROGRESS NOTES
"Subjective:      Patient ID: Yany Yuan is a 53 y.o. female.    Vitals:  height is 4' 11" (1.499 m) and weight is 68.9 kg (152 lb). Her oral temperature is 98.1 °F (36.7 °C). Her blood pressure is 158/99 (abnormal) and her pulse is 70. Her respiration is 16 and oxygen saturation is 97%.     Chief Complaint: Vaginal Discharge (Patient reports vaginal discharge, urinary urgency and pain when having intercourse. She was treated for Cystitis on 10/4/2023 but states the symptoms never went away.)    Cc as above. She was treated for UTI at the clinic on 10/4/23 with Macrobid, but symptoms continues. She is still having urinary urgency with occasional dysuria. Final urine culture on 10/4/23 was positive for Escherichia coli ESBL. Denies any abdominal pain or fever.     Vaginal Discharge  The patient's primary symptoms include vaginal discharge. This is a recurrent problem. The problem occurs intermittently. The problem has been unchanged. Associated symptoms include dysuria, painful intercourse and urgency. Pertinent negatives include no abdominal pain, chills, fever, hematuria, nausea or vomiting. There has been no bleeding. The symptoms are aggravated by intercourse. She has tried nothing for the symptoms. She is sexually active. She uses hysterectomy for contraception. She is postmenopausal.       Constitution: Negative for chills and fever.   Gastrointestinal:  Negative for abdominal pain, nausea and vomiting.   Genitourinary:  Positive for dysuria, urgency and vaginal discharge. Negative for hematuria.      Objective:     Physical Exam   Constitutional: She is oriented to person, place, and time. She appears well-developed. She does not appear ill. No distress. normal  HENT:   Head: Normocephalic and atraumatic.   Nose: Nose normal. No nasal deformity. No epistaxis.   Mouth/Throat: Oropharynx is clear and moist and mucous membranes are normal.   Eyes: Conjunctivae and lids are normal.   Neck: Trachea normal and " phonation normal. Neck supple.   Cardiovascular: Normal rate, regular rhythm, normal heart sounds and normal pulses.   Pulmonary/Chest: Effort normal and breath sounds normal. No respiratory distress.   Abdominal: Normal appearance and bowel sounds are normal. She exhibits no distension. Soft. There is no abdominal tenderness. There is no rebound, no guarding, no left CVA tenderness and no right CVA tenderness.   Genitourinary:    Vaginal discharge present.           Comments: deferred     Musculoskeletal: Normal range of motion.         General: Normal range of motion.   Neurological: no focal deficit. She is alert and oriented to person, place, and time.   Skin: Skin is warm, dry and intact.   Psychiatric: Her speech is normal and behavior is normal. Mood normal.   Nursing note and vitals reviewed.      Assessment:     1. Cystitis with hematuria    2. Vaginal discharge    3. Elevated blood pressure reading with diagnosis of hypertension    4. Referral of patient        Plan:       Cystitis with hematuria  -     POCT Urinalysis, Dipstick, Automated, W/O Scope  -     amoxicillin-clavulanate 875-125mg (AUGMENTIN) 875-125 mg per tablet; Take 1 tablet by mouth every 12 (twelve) hours. for 7 days  Dispense: 14 tablet; Refill: 0  -     Urine culture  -     ondansetron (ZOFRAN-ODT) 4 MG TbDL; Take 1 tablet (4 mg total) by mouth every 6 (six) hours as needed (nausea/vomiting).  Dispense: 12 tablet; Refill: 0    Vaginal discharge  -     Wet Prep, Genital    Elevated blood pressure reading with diagnosis of hypertension    Referral of patient  -     Ambulatory referral/consult to Gynecology    Drink plenty of water. Tylenol or motrin for pain and fever. Take antibiotics as prescribed - do not stop them early. If you need an antibiotic change, we will notify you in approximately 3 days. If you do not receive a call from us, continue the medication you received today. Follow up with PCP in 2 weeks for a recheck. Strict ER  precautions. States understanding.

## 2023-11-01 NOTE — PATIENT INSTRUCTIONS
Drink plenty of water. Tylenol or motrin for pain and fever. Take antibiotics as prescribed - do not stop them early. If you need an antibiotic change, we will notify you in approximately 3 days. If you do not receive a call from us, continue the medication you received today. Follow up with PCP in 2 weeks for a recheck. If symptoms persist or worsen, such as fever, abdominal pain, back pain, or other concerns go to the ER.

## 2023-11-02 NOTE — TELEPHONE ENCOUNTER
----- Message from KEVIN Mckeon sent at 11/1/2023  2:24 PM CDT -----  Wet smear was positive for trichomonas, which is an STD. Rx for flagyl sent to your pharmacy (Take all 4 pills together once) and avoid any alcohol intake for 48 hours, because drinking alcohol while take flagyl can make you sick. Have your partner also tested. No sexual activity x 1 week.

## 2023-11-03 LAB — BACTERIA UR CULT: NORMAL

## 2024-01-12 ENCOUNTER — OFFICE VISIT (OUTPATIENT)
Dept: URGENT CARE | Facility: CLINIC | Age: 54
End: 2024-01-12
Payer: MEDICAID

## 2024-01-12 VITALS
OXYGEN SATURATION: 99 % | RESPIRATION RATE: 18 BRPM | WEIGHT: 155.88 LBS | HEART RATE: 82 BPM | DIASTOLIC BLOOD PRESSURE: 88 MMHG | BODY MASS INDEX: 30.61 KG/M2 | SYSTOLIC BLOOD PRESSURE: 136 MMHG | TEMPERATURE: 98 F | HEIGHT: 60 IN

## 2024-01-12 DIAGNOSIS — J02.9 SORE THROAT: Primary | ICD-10-CM

## 2024-01-12 LAB
CTP QC/QA: YES
CTP QC/QA: YES
MOLECULAR STREP A: NEGATIVE
POC MOLECULAR INFLUENZA A AGN: NEGATIVE
POC MOLECULAR INFLUENZA B AGN: NEGATIVE

## 2024-01-12 PROCEDURE — 87651 STREP A DNA AMP PROBE: CPT | Mod: PBBFAC | Performed by: FAMILY MEDICINE

## 2024-01-12 PROCEDURE — 99213 OFFICE O/P EST LOW 20 MIN: CPT | Mod: S$PBB,,, | Performed by: FAMILY MEDICINE

## 2024-01-12 PROCEDURE — 99215 OFFICE O/P EST HI 40 MIN: CPT | Mod: PBBFAC | Performed by: FAMILY MEDICINE

## 2024-01-12 PROCEDURE — 87502 INFLUENZA DNA AMP PROBE: CPT | Mod: PBBFAC | Performed by: FAMILY MEDICINE

## 2024-01-12 RX ORDER — SOD SULF/POT CHLORIDE/MAG SULF 1.479 G
1 TABLET ORAL
COMMUNITY
Start: 2023-12-04

## 2024-01-12 RX ORDER — CYCLOBENZAPRINE HCL 5 MG
5 TABLET ORAL
COMMUNITY
Start: 2023-12-23

## 2024-01-12 RX ORDER — DICLOFENAC SODIUM 75 MG/1
75 TABLET, DELAYED RELEASE ORAL 2 TIMES DAILY
COMMUNITY
Start: 2023-12-23

## 2024-01-12 RX ORDER — BRIMONIDINE 5 MG/G
1 GEL TOPICAL
COMMUNITY
Start: 2023-12-18 | End: 2024-01-17

## 2024-01-12 RX ORDER — CARIPRAZINE 3 MG/1
CAPSULE, GELATIN COATED ORAL
COMMUNITY
Start: 2023-11-16

## 2024-01-12 RX ORDER — ERGOCALCIFEROL 1.25 MG/1
50000 CAPSULE ORAL
COMMUNITY

## 2024-01-12 NOTE — PROGRESS NOTES
Subjective:       Patient ID: Yany Yuan is a 53 y.o. female.    Vitals:  height is 5' (1.524 m) and weight is 70.7 kg (155 lb 13.8 oz). Her temperature is 98.3 °F (36.8 °C). Her blood pressure is 136/88 and her pulse is 82. Her respiration is 18 and oxygen saturation is 99%.     Chief Complaint: Sore Throat (Ha   x 2days     COVID NEGATIVE PER [facility] TODAY  ( DID NOT WAIT FOR RESULTS))    2 days of sore throat, had fever but that resolved.  Tested negative for COVID earlier today.    Sore Throat   Pertinent negatives include no congestion, coughing, diarrhea, drooling, neck pain, shortness of breath, stridor, swollen glands, trouble swallowing or vomiting.         HENT:  Positive for sore throat. Negative for drooling, congestion and trouble swallowing.    Neck: Negative for neck pain.   Respiratory:  Negative for cough, shortness of breath and stridor.    Gastrointestinal:  Negative for vomiting and diarrhea.       Objective:   Physical Exam   Constitutional: She appears well-developed.  Non-toxic appearance. She does not appear ill. No distress.   HENT:   Head: Atraumatic.   Nose: No purulent discharge. Right sinus exhibits no maxillary sinus tenderness and no frontal sinus tenderness. Left sinus exhibits no maxillary sinus tenderness and no frontal sinus tenderness.   Mouth/Throat: Uvula is midline. Posterior oropharyngeal erythema (faint, diffuse) present. No oropharyngeal exudate.   Eyes: Right eye exhibits no discharge. Left eye exhibits no discharge. Extraocular movement intact   Neck: Neck supple. No neck rigidity present.   Cardiovascular: Regular rhythm.   Pulmonary/Chest: Effort normal and breath sounds normal. No respiratory distress. She has no wheezes. She has no rales.   Lymphadenopathy:     She has no cervical adenopathy.   Neurological: She is alert.   Skin: Skin is warm, dry and not diaphoretic.   Psychiatric: Her behavior is normal.   Nursing note and vitals reviewed.      Results for orders  placed or performed in visit on 01/12/24   POCT Strep A, Molecular   Result Value Ref Range    Molecular Strep A, POC Negative Negative     Acceptable Yes    POCT Influenza A/B Molecular   Result Value Ref Range    POC Molecular Influenza A Ag Negative Negative, Not Reported    POC Molecular Influenza B Ag Negative Negative, Not Reported     Acceptable Yes        Assessment:     1. Sore throat          Plan:   If medications were prescribed, please take as directed. Consider using warm salt water gargles, drink plenty of fluids.      Please follow instructions on patient education material.  Return to urgent care in 2 to 3 days if symptoms are not improving. Seek care immediately if new or worsening symptoms develop.    Sore throat  -     POCT Strep A, Molecular  -     POCT Influenza A/B Molecular        Please note: This chart was completed via voice to text dictation. It may contain typographical/word recognition errors. If there are any questions, please contact the provider for final clarification.

## 2024-01-12 NOTE — LETTER
January 12, 2024      [facility]  [address] [address]  Phone: [phone]       Patient: Yany Yuan   YOB: 1970  Date of Visit: 01/12/2024    To Whom It May Concern:    Margoth Yuan  was at [facility] on 01/12/2024. The patient may return to work/school on JAN 14 2024 with no restrictions. If you have any questions or concerns, or if I can be of further assistance, please do not hesitate to contact me.    Sincerely,    TARUN LYNCH MD

## 2024-03-18 ENCOUNTER — HOSPITAL ENCOUNTER (EMERGENCY)
Facility: HOSPITAL | Age: 54
Discharge: HOME | End: 2024-03-18
Attending: EMERGENCY MEDICINE
Payer: MEDICAID

## 2024-03-18 VITALS
OXYGEN SATURATION: 97 % | RESPIRATION RATE: 18 BRPM | HEART RATE: 91 BPM | TEMPERATURE: 98 F | HEIGHT: 58 IN | BODY MASS INDEX: 32.32 KG/M2 | SYSTOLIC BLOOD PRESSURE: 152 MMHG | WEIGHT: 154 LBS | DIASTOLIC BLOOD PRESSURE: 92 MMHG

## 2024-03-18 DIAGNOSIS — M25.541 ARTHRALGIA OF RIGHT HAND: Primary | ICD-10-CM

## 2024-03-18 PROCEDURE — 99284 EMERGENCY DEPT VISIT MOD MDM: CPT | Mod: 25

## 2024-03-18 PROCEDURE — 63600175 PHARM REV CODE 636 W HCPCS: Performed by: EMERGENCY MEDICINE

## 2024-03-18 PROCEDURE — 96372 THER/PROPH/DIAG INJ SC/IM: CPT | Performed by: EMERGENCY MEDICINE

## 2024-03-18 RX ORDER — DEXAMETHASONE SODIUM PHOSPHATE 4 MG/ML
8 INJECTION, SOLUTION INTRA-ARTICULAR; INTRALESIONAL; INTRAMUSCULAR; INTRAVENOUS; SOFT TISSUE
Status: COMPLETED | OUTPATIENT
Start: 2024-03-18 | End: 2024-03-18

## 2024-03-18 RX ORDER — NAPROXEN 375 MG/1
375 TABLET ORAL 2 TIMES DAILY PRN
Qty: 20 TABLET | Refills: 0 | Status: SHIPPED | OUTPATIENT
Start: 2024-03-18

## 2024-03-18 RX ADMIN — DEXAMETHASONE SODIUM PHOSPHATE 8 MG: 4 INJECTION, SOLUTION INTRA-ARTICULAR; INTRALESIONAL; INTRAMUSCULAR; INTRAVENOUS; SOFT TISSUE at 10:03

## 2024-03-18 NOTE — Clinical Note
"Yany"Carlton Yuan was seen and treated in our emergency department on 3/18/2024.  She may return to work on 03/20/2024.       If you have any questions or concerns, please don't hesitate to call.      Ai Cage MD"

## 2024-03-19 NOTE — ED PROVIDER NOTES
Encounter Date: 3/18/2024       History     Chief Complaint   Patient presents with    Finger Pain     53-year-old female complains of pain to her right index finger DIP joint.  She has had joint pain in other areas and history of joint erosion to her right thumb.  She does not recall any specific injury to the index finger.  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        Review of patient's allergies indicates:   Allergen Reactions    Amoxicillin Nausea And Vomiting    Tetracycline Rash    Tetracyclines Rash     Past Medical History:   Diagnosis Date    Bipolar disorder, unspecified     Depression     HTN (hypertension)      Past Surgical History:   Procedure Laterality Date    APPENDECTOMY      BACK SURGERY      CARPAL TUNNEL RELEASE Bilateral     HYSTERECTOMY      TUBAL LIGATION       Family History   Problem Relation Age of Onset    Colon cancer Mother             Hypertension Mother     Diabetes type II Father             Hypertension Father     No Known Problems Sister     No Known Problems Brother      Social History     Tobacco Use    Smoking status: Former     Types: Cigarettes    Smokeless tobacco: Never   Substance Use Topics    Alcohol use: Yes     Comment: rarely    Drug use: Never     Review of Systems   Musculoskeletal:  Positive for arthralgias.   All other systems reviewed and are negative.      Physical Exam     Initial Vitals [24]   BP Pulse Resp Temp SpO2   (!) 152/92 91 18 97.9 °F (36.6 °C) 97 %      MAP       --         Physical Exam    Nursing note and vitals reviewed.  Constitutional: She appears well-developed and well-nourished.   Pulmonary/Chest: No respiratory distress.   Musculoskeletal:      Comments: Right hand has swelling of the index finger D IP joint with minimal flexion at this joint.  There is no redness or induration  concerning for infection.  She has small abrasions and red areas to multiple areas of both hands which she states is due to trauma from her job.  However, the rest of her joints seemed to have full range of motion.  Radial pulses 2+ with brisk capillary refill to the fingertips.     Neurological: She is alert and oriented to person, place, and time.         ED Course   Procedures  Labs Reviewed - No data to display       Imaging Results              X-Ray Finger 2 or More Views Right (Final result)  Result time 03/18/24 22:28:11      Final result by Silvano Lawrence MD (03/18/24 22:28:11)                   Impression:      Distal interphalangeal joint some degenerative arthritic changes.      Electronically signed by: Silvano Lawrence  Date:    03/18/2024  Time:    22:28               Narrative:    EXAMINATION:  XR FINGER 2 OR MORE VIEWS RIGHT    CLINICAL HISTORY:  finger pain, right index;    TECHNIQUE:  Three-view    COMPARISON:  None available    FINDINGS:  Right index finger distal interphalangeal joint shows some degenerative arthritic changes.  Articular surfaces alignment is preserved.  No acute fracture or dislocation identified.  No soft tissue radiopaque foreign body.                                       Medications   dexAMETHasone injection 8 mg (8 mg Intramuscular Given 3/18/24 2234)     Medical Decision Making  See HPI for narrative    Differential diagnosis includes but is not limited to infection, arthritis, contusion, sprain    Amount and/or Complexity of Data Reviewed  Radiology: ordered.  Discussion of management or test interpretation with external provider(s): Patient was seen and evaluated in the Emergency Department with history, physical exam, and x-ray.  She does have some degenerative changes to the joint that is bothering her and there is no sign of infection.  I will put her on an anti-inflammatory and recommend that she follow-up with her PCP for further care.  I discussed with her using  caution at work to avoid stressing this area.      Risk  Prescription drug management.                                      Clinical Impression:  Final diagnoses:  [M25.541] Arthralgia of right hand (Primary)          ED Disposition Condition    Discharge Stable          ED Prescriptions       Medication Sig Dispense Start Date End Date Auth. Provider    naproxen (NAPROSYN) 375 MG tablet Take 1 tablet (375 mg total) by mouth 2 (two) times daily as needed (pain). 20 tablet 3/18/2024 -- Ai Cage MD          Follow-up Information       Follow up With Specialties Details Why Contact Info    Mary Navarro MD Family Medicine Schedule an appointment as soon as possible for a visit   [address]  [address]  [facility]  [facility] [address]  [phone]               Ai Cage MD  03/19/24 [id number]

## 2024-03-23 ENCOUNTER — OFFICE VISIT (OUTPATIENT)
Dept: URGENT CARE | Facility: CLINIC | Age: 54
End: 2024-03-23
Payer: MEDICAID

## 2024-03-23 VITALS
RESPIRATION RATE: 18 BRPM | BODY MASS INDEX: 30.73 KG/M2 | SYSTOLIC BLOOD PRESSURE: 117 MMHG | TEMPERATURE: 99 F | HEIGHT: 60 IN | WEIGHT: 156.5 LBS | HEART RATE: 105 BPM | OXYGEN SATURATION: 95 % | DIASTOLIC BLOOD PRESSURE: 77 MMHG

## 2024-03-23 DIAGNOSIS — B96.89 BACTERIAL SINUSITIS: Primary | ICD-10-CM

## 2024-03-23 DIAGNOSIS — J32.9 BACTERIAL SINUSITIS: Primary | ICD-10-CM

## 2024-03-23 DIAGNOSIS — J02.9 SORE THROAT: ICD-10-CM

## 2024-03-23 LAB
CTP QC/QA: YES
MOLECULAR STREP A: NEGATIVE

## 2024-03-23 PROCEDURE — 87651 STREP A DNA AMP PROBE: CPT | Mod: PBBFAC | Performed by: FAMILY MEDICINE

## 2024-03-23 PROCEDURE — 99214 OFFICE O/P EST MOD 30 MIN: CPT | Mod: S$PBB,,, | Performed by: FAMILY MEDICINE

## 2024-03-23 PROCEDURE — 99214 OFFICE O/P EST MOD 30 MIN: CPT | Mod: PBBFAC | Performed by: FAMILY MEDICINE

## 2024-03-23 RX ORDER — CLINDAMYCIN HYDROCHLORIDE 300 MG/1
300 CAPSULE ORAL EVERY 8 HOURS
Qty: 30 CAPSULE | Refills: 0 | Status: SHIPPED | OUTPATIENT
Start: 2024-03-23 | End: 2024-04-02

## 2024-03-23 NOTE — LETTER
March 23, 2024      [facility]  [address] [address]  Phone: [phone]       Patient: Yany Yuan   YOB: 1970  Date of Visit: 03/23/2024    To Whom It May Concern:    Margoth Yuan  was at [facility] on 03/23/2024. The patient may return to work/school on MAR 25 2024 with no restrictions. If you have any questions or concerns, or if I can be of further assistance, please do not hesitate to contact me.    Sincerely,    HENRIETTA WHITE MD

## 2024-03-23 NOTE — PROGRESS NOTES
Subjective:       Patient ID: Yany Yuan is a 53 y.o. female.    Chief Complaint: URI (Cough,body aches,sore throat > 1wk)      URI       53-year-old female with cough, body aches, and sore throat for more than 1 week.  No known sick contacts.  She had fever this morning.  Review of Systems   Constitutional:         As above   HENT:          As above   Respiratory:          As above         Objective:       Vital Signs  Temp: 99.3 °F (37.4 °C)  Pulse: 105  Resp: 18  SpO2: 95 %  BP: 117/77  Pain Score: 0-No pain  Height and Weight  Height: 5' (152.4 cm)  Weight: 71 kg (156 lb 8.4 oz)  BSA (Calculated - sq m): 1.73 sq meters  BMI (Calculated): 30.6  Weight in (lb) to have BMI = 25: 127.7]  Physical Exam  Constitutional:       Appearance: Normal appearance.   HENT:      Head: Normocephalic and atraumatic.      Nose: Congestion present. No rhinorrhea.      Mouth/Throat:      Pharynx: Posterior oropharyngeal erythema present. No oropharyngeal exudate.   Eyes:      Extraocular Movements: Extraocular movements intact.      Conjunctiva/sclera: Conjunctivae normal.   Cardiovascular:      Rate and Rhythm: Normal rate and regular rhythm.      Heart sounds: Normal heart sounds.   Pulmonary:      Breath sounds: Normal breath sounds.   Musculoskeletal:      Cervical back: Tenderness present.   Lymphadenopathy:      Cervical: Cervical adenopathy present.   Skin:     General: Skin is warm and dry.   Neurological:      General: No focal deficit present.      Mental Status: She is alert.   Psychiatric:         Mood and Affect: Mood and affect normal.         Speech: Speech normal.         Behavior: Behavior normal. Behavior is cooperative.         Thought Content: Thought content does not include homicidal or suicidal ideation.         Assessment:       Problem List Items Addressed This Visit    None  Visit Diagnoses       Bacterial sinusitis    -  Primary    Relevant Medications    clindamycin (CLEOCIN) 300 MG capsule    Sore  throat        Relevant Orders    POCT Strep A, Molecular (Completed)            Plan:   Encouraged antihistamines as needed  Encouraged ibuprofen/acetaminophen as needed  ER precautions  Follow-up with PCP

## 2024-04-10 ENCOUNTER — OFFICE VISIT (OUTPATIENT)
Dept: URGENT CARE | Facility: CLINIC | Age: 54
End: 2024-04-10
Payer: MEDICAID

## 2024-04-10 VITALS
DIASTOLIC BLOOD PRESSURE: 85 MMHG | SYSTOLIC BLOOD PRESSURE: 133 MMHG | TEMPERATURE: 99 F | WEIGHT: 159.63 LBS | RESPIRATION RATE: 18 BRPM | HEIGHT: 59 IN | OXYGEN SATURATION: 97 % | BODY MASS INDEX: 32.18 KG/M2 | HEART RATE: 82 BPM

## 2024-04-10 DIAGNOSIS — K08.89 PAIN, DENTAL: Primary | ICD-10-CM

## 2024-04-10 PROCEDURE — 99215 OFFICE O/P EST HI 40 MIN: CPT | Mod: PBBFAC | Performed by: FAMILY MEDICINE

## 2024-04-10 PROCEDURE — 99213 OFFICE O/P EST LOW 20 MIN: CPT | Mod: S$PBB,,, | Performed by: FAMILY MEDICINE

## 2024-04-10 RX ORDER — TRAMADOL HYDROCHLORIDE 50 MG/1
TABLET ORAL
COMMUNITY
Start: 2024-04-02

## 2024-04-10 RX ORDER — CLINDAMYCIN HYDROCHLORIDE 150 MG/1
150 CAPSULE ORAL 3 TIMES DAILY
Qty: 30 CAPSULE | Refills: 0 | Status: SHIPPED | OUTPATIENT
Start: 2024-04-10 | End: 2024-04-20

## 2024-04-10 RX ORDER — DICLOFENAC SODIUM 75 MG/1
75 TABLET, DELAYED RELEASE ORAL 2 TIMES DAILY
Qty: 30 TABLET | Refills: 1 | Status: SHIPPED | OUTPATIENT
Start: 2024-04-10

## 2024-04-10 NOTE — PROGRESS NOTES
"Subjective:       Patient ID: Yany Yuan is a 53 y.o. female.    Vitals:  height is 4' 11" (1.499 m) and weight is 72.4 kg (159 lb 9.8 oz). Her temperature is 98.5 °F (36.9 °C). Her blood pressure is 133/85 and her pulse is 82. Her respiration is 18 and oxygen saturation is 97%.     Chief Complaint: Dental Pain (RT lower x 5days )    Patient with history of dental caries, right lower molar pain for several days.  No sore throat or difficulty swallowing.  No fever.  No neck pain or stiffness.    All other systems are negative    Chart reviewed    Objective:   Physical Exam  Oral exam  VITAL SIGNS:  Reviewed.      GENERAL:  In no apparent distress  HEAD:  No signs of head trauma  EYES:  Extraocular motions intact  NOSE: No sinus tenderness  MOUTH: Posterior pharynx is clear.  Scattered caries, small amount of right lower gingival edema.  Small amount of mandibular soft tissue swelling.  No erythema.  No tenderness.  NECK:  No adenopathy, no JVD           Assessment:     1. Pain, dental            Plan:   Will give a course of clindamycin, few diclofenac with side effect precautions.  See dentist as soon as possible.  ER precautions.      Pain, dental  -     diclofenac (VOLTAREN) 75 MG EC tablet; Take 1 tablet (75 mg total) by mouth 2 (two) times daily.  Dispense: 30 tablet; Refill: 1    Other orders  -     clindamycin (CLEOCIN) 150 MG capsule; Take 1 capsule (150 mg total) by mouth 3 (three) times daily. for 10 days  Dispense: 30 capsule; Refill: 0        Please note: This chart was completed via voice to text dictation. It may contain typographical/word recognition errors. If there are any questions, please contact the provider for final clarification.    "

## 2024-05-25 ENCOUNTER — HOSPITAL ENCOUNTER (EMERGENCY)
Facility: HOSPITAL | Age: 54
Discharge: HOME | End: 2024-05-25
Attending: INTERNAL MEDICINE
Payer: MEDICAID

## 2024-05-25 VITALS
HEIGHT: 59 IN | BODY MASS INDEX: 31.45 KG/M2 | HEART RATE: 87 BPM | SYSTOLIC BLOOD PRESSURE: 157 MMHG | OXYGEN SATURATION: 99 % | WEIGHT: 156 LBS | DIASTOLIC BLOOD PRESSURE: 91 MMHG | TEMPERATURE: 98 F | RESPIRATION RATE: 16 BRPM

## 2024-05-25 DIAGNOSIS — F19.982 SUBSTANCE OR MEDICATION-INDUCED SLEEP DISORDER, INSOMNIA TYPE: Primary | ICD-10-CM

## 2024-05-25 DIAGNOSIS — Z86.59 HISTORY OF BIPOLAR DISORDER: ICD-10-CM

## 2024-05-25 PROCEDURE — 99283 EMERGENCY DEPT VISIT LOW MDM: CPT | Mod: 25

## 2024-05-25 PROCEDURE — 93010 ELECTROCARDIOGRAM REPORT: CPT | Mod: ,,, | Performed by: INTERNAL MEDICINE

## 2024-05-25 PROCEDURE — 93005 ELECTROCARDIOGRAM TRACING: CPT

## 2024-05-25 RX ORDER — TEMAZEPAM 15 MG/1
15 CAPSULE ORAL NIGHTLY PRN
Qty: 4 CAPSULE | Refills: 0 | Status: SHIPPED | OUTPATIENT
Start: 2024-05-25

## 2024-05-25 NOTE — ED PROVIDER NOTES
Source of History:  Patient, no limitations    Chief complaint:  Psychiatric Evaluation (Pt states she doesn't feel right and can't sleep. States her bipolar meds may be causing this. Denies S.I or H.I.)      HPI:  Yany Yuan is a 53 y.o. female presenting with Psychiatric Evaluation (Pt states she doesn't feel right and can't sleep. States her bipolar meds may be causing this. Denies S.I or H.I.)       54yo F hx of Bipolar presents for insomnia x 5 days. Reports routine visits at [facility] q3 months, last visit was in April were reported medication changes including DC VRAYLAR and starting TRILEPTAL, reports not liking the change and decided to stop the trileptal and resume her vraylear which reportedly is causing some sleep issues. Denies SI, Denies HI, Denies hallucinations. Oriented x 4 and in sound mind.     Patient presents with depression/anxiety and insomnia. Onset of symptoms was 5 days ago.  Symptoms are of mild severity and are gradually worsening.  Patient states symptoms have been exacerbated by chemical dependency. Symptoms are associated with mulitple medications. History obtained from: patient.         Review of Systems   Constitutional symptoms:  Negative except as documented in HPI.   Skin symptoms:  Negative except as documented in HPI.   HEENT symptoms:  Negative except as documented in HPI.   Respiratory symptoms:  Negative except as documented in HPI.   Cardiovascular symptoms:  Negative except as documented in HPI.   Gastrointestinal symptoms:  Negative except as documented in HPI.    Genitourinary symptoms:  Negative except as documented in HPI.   Musculoskeletal symptoms:  Negative except as documented in HPI.   Neurologic symptoms:  Negative except as documented in HPI.   Psychiatric symptoms:  Negative except as documented in HPI.   Allergy/immunologic symptoms:  Negative except as documented in HPI.             Additional review of systems information: All other systems  "reviewed and otherwise negative.      Review of patient's allergies indicates:   Allergen Reactions    Amoxicillin Nausea And Vomiting    Tetracycline Rash    Tetracyclines Rash       PMH:  As per HPI and below:    Past Medical History:   Diagnosis Date    Bipolar disorder, unspecified     Depression     HTN (hypertension)        Family History   Problem Relation Name Age of Onset    Colon cancer Mother              Hypertension Mother      Diabetes type II Father              Hypertension Father      No Known Problems Sister      No Known Problems Brother         Past Surgical History:   Procedure Laterality Date    APPENDECTOMY      BACK SURGERY      CARPAL TUNNEL RELEASE Bilateral     HYSTERECTOMY      TUBAL LIGATION         Social History     Tobacco Use    Smoking status: Former     Types: Cigarettes    Smokeless tobacco: Never   Substance Use Topics    Alcohol use: Yes     Comment: rarely    Drug use: Never       Patient Active Problem List   Diagnosis    Gastroesophageal reflux disease    Acute depression    Bipolar disorder    Family history of colon cancer    History of hysterectomy    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    General:  Alert, no acute distress.   Skin: Normal for Ethnic Origin, No cyanosis  HEENT: Normocephalic and atraumatic, Vision unchanged, Pupils symmetric, No icterus , Nasal mucosa is pink and " moist  Cardiovascular:  Regular rate and rhythm, No edema  Chest Wall: No deformity, equal chest rise  Respiratory:  Lungs are clear to auscultation, respirations are non-labored.    Musculoskeletal:  No deformity, Normal perfusion to all extremities  Gastrointestinal:  Soft, Non distended  Neurological:  Alert and oriented, normal motor observed, normal speech observed.    Psychiatric:  Cooperative, appropriate mood & affect. GCS of 15, Oriented x 4, intact judgment during this exam        Labs that have been ordered have been independently reviewed and interpreted by myself.     Old Chart Reviewed.      Initial Impression/ Differential Dx:  Decompensated psychiatric disease (schizophrenia, bipolar disorder, major depression), excited delirium, medication noncompliance, substance abuse/withdrawal, intentional drug overdose, medication toxicity, APAP/ASA overdose, acute stress reaction, personality disorder, malingering, metabolic derangement      MDM:      Reviewed Nurses Note.    Reviewed Pertinent old records.    Orders Placed This Encounter    EKG 12-lead    temazepam (RESTORIL) 15 mg Cap                    Labs Reviewed - No data to display       No orders to display        No visits with results within 1 Day(s) from this visit.   Latest known visit with results is:   Office Visit on 03/23/2024   Component Date Value Ref Range Status    Molecular Strep A, POC 03/23/2024 Negative  Negative Final     Acceptable 03/23/2024 Yes   Final       Imaging Results    None           ECG Results              EKG 12-lead (Preliminary result)  Result time 05/25/24 16:11:38      Wet Read by Rigo Ventura DO (05/25/24 16:11:38, [facility] - Emergency Dept, Emergency Medicine)    Independent ECG Interpretation:    Normal sinus rhythm at rate of 81. Normal intervals. Normal QRS. No acute ST or T wave abnormalities. Overall impression: No evidence of acute ischemia or arrhythmia.                                                 ED Course as of 05/25/24 1629   Sat May 25, 2024   1609 Pt is to call [facility] for urgent evaluation [MP]   1610 Unable to give sedating medications in ED as she plans to drive home after this visit [MP]      ED Course User Index  [MP] Rigo Ventura DO                        Diagnostic Impression:    1. Substance or medication-induced sleep disorder, insomnia type    2. History of bipolar disorder         ED Disposition Condition    Discharge Stable             Follow-up Information       [facility] - Emergency Dept.    Specialty: Emergency Medicine  Why: If symptoms worsen  Contact information:  [address]  [facility] [id number] [phone]             Schedule an appointment as soon as possible for a visit  with [facility].    Contact information:  [place] [address]                            ED Prescriptions       Medication Sig Dispense Start Date End Date Auth. Provider    temazepam (RESTORIL) 15 mg Cap Take 1 capsule (15 mg total) by mouth nightly as needed (insomnia). 4 capsule 5/25/2024 -- Rigo Ventura DO          Follow-up Information       Follow up With Specialties Details Why Contact Info    [facility] - Emergency Dept Emergency Medicine  If symptoms worsen [address]  [facility] [id number] [phone]    [facility]  Schedule an appointment as soon as possible for a visit   [place] [address]             Rigo Ventura DO  05/25/24 1621

## 2024-05-28 LAB
OHS QRS DURATION: 82 MS
OHS QTC CALCULATION: 436 MS

## 2024-12-22 ENCOUNTER — HOSPITAL ENCOUNTER (EMERGENCY)
Facility: HOSPITAL | Age: 54
Discharge: HOME | End: 2024-12-22
Attending: EMERGENCY MEDICINE
Payer: MEDICAID

## 2024-12-22 VITALS
HEIGHT: 59 IN | RESPIRATION RATE: 18 BRPM | SYSTOLIC BLOOD PRESSURE: 133 MMHG | HEART RATE: 70 BPM | WEIGHT: 168.63 LBS | DIASTOLIC BLOOD PRESSURE: 88 MMHG | BODY MASS INDEX: 34 KG/M2 | OXYGEN SATURATION: 97 % | TEMPERATURE: 98 F

## 2024-12-22 DIAGNOSIS — S01.111A EYEBROW LACERATION, RIGHT, INITIAL ENCOUNTER: Primary | ICD-10-CM

## 2024-12-22 PROCEDURE — 90471 IMMUNIZATION ADMIN: CPT | Performed by: PHYSICIAN ASSISTANT

## 2024-12-22 PROCEDURE — 90715 TDAP VACCINE 7 YRS/> IM: CPT | Performed by: PHYSICIAN ASSISTANT

## 2024-12-22 PROCEDURE — 63600175 PHARM REV CODE 636 W HCPCS: Performed by: PHYSICIAN ASSISTANT

## 2024-12-22 PROCEDURE — 99284 EMERGENCY DEPT VISIT MOD MDM: CPT | Mod: 25

## 2024-12-22 PROCEDURE — 12011 RPR F/E/E/N/L/M 2.5 CM/<: CPT

## 2024-12-22 RX ORDER — LIDOCAINE HYDROCHLORIDE 10 MG/ML
5 INJECTION, SOLUTION EPIDURAL; INFILTRATION; INTRACAUDAL; PERINEURAL
Status: COMPLETED | OUTPATIENT
Start: 2024-12-22 | End: 2024-12-22

## 2024-12-22 RX ADMIN — TETANUS TOXOID, REDUCED DIPHTHERIA TOXOID AND ACELLULAR PERTUSSIS VACCINE, ADSORBED 0.5 ML: 5; 2.5; 8; 8; 2.5 SUSPENSION INTRAMUSCULAR at 05:12

## 2024-12-22 RX ADMIN — LIDOCAINE HYDROCHLORIDE 50 MG: 10 INJECTION, SOLUTION EPIDURAL; INFILTRATION; INTRACAUDAL; PERINEURAL at 05:12

## 2024-12-23 NOTE — ED PROVIDER NOTES
Encounter Date: 2024       History     Chief Complaint   Patient presents with    Laceration     Pt reports known dog jumped up and paw came down on face causing laceration.      Yany Yuan is a 54 y.o. female who presents to the ED with complaints of a laceration to her R eyebrow that occurred just prior to arrival. She reports her puppy jumped up and its paw scratched her face. Not up to date on tetanus vaccine. Dog is up to date on vaccines. She is positive the dogs paw scratched her and not its tooth. Denies vision change.        The history is provided by the patient. No  was used.     Review of patient's allergies indicates:   Allergen Reactions    Amoxicillin Nausea And Vomiting    Tetracycline Rash    Tetracyclines Rash     Past Medical History:   Diagnosis Date    Bipolar disorder, unspecified     Depression     HTN (hypertension)      Past Surgical History:   Procedure Laterality Date    APPENDECTOMY      BACK SURGERY      CARPAL TUNNEL RELEASE Bilateral     HYSTERECTOMY      TUBAL LIGATION       Family History   Problem Relation Name Age of Onset    Colon cancer Mother              Hypertension Mother      Diabetes type II Father              Hypertension Father      No Known Problems Sister      No Known Problems Brother       Social History     Tobacco Use    Smoking status: Former     Types: Cigarettes    Smokeless tobacco: Never   Substance Use Topics    Alcohol use: Yes     Comment: rarely    Drug use: Never     Review of Systems   Constitutional:  Negative for chills, fatigue and fever.   HENT:  Negative for congestion, ear pain, sinus pain and sore throat.    Eyes:  Negative for pain.   Respiratory:  Negative for cough, chest tightness and shortness of breath.    Cardiovascular:  Negative for chest pain.   Gastrointestinal:  Negative for abdominal pain, constipation, diarrhea, nausea and vomiting.   Genitourinary:  Negative for dysuria.    Musculoskeletal:  Negative for back pain and joint swelling.   Skin:  Positive for wound. Negative for color change and rash.   Neurological:  Negative for dizziness and weakness.   Psychiatric/Behavioral:  Negative for behavioral problems and confusion.        Physical Exam     Initial Vitals [12/22/24 1559]   BP Pulse Resp Temp SpO2   (!) 163/95 86 16 98 °F (36.7 °C) 95 %      MAP       --         Physical Exam    Nursing note and vitals reviewed.  Constitutional: She appears well-developed and well-nourished.   HENT:   Head: Normocephalic and atraumatic.   Nose: Nose normal.   Eyes: EOM are normal. Pupils are equal, round, and reactive to light.       Neck: Neck supple. No thyromegaly present. No JVD present.   Normal range of motion.  Cardiovascular:  Normal rate, regular rhythm, normal heart sounds and intact distal pulses.           No murmur heard.  Pulmonary/Chest: Breath sounds normal. No respiratory distress. She has no wheezes. She has no rhonchi. She has no rales. She exhibits no tenderness.   Abdominal: Abdomen is soft. Bowel sounds are normal. She exhibits no distension. There is no abdominal tenderness. There is no rebound and no guarding.   Musculoskeletal:         General: No tenderness or edema. Normal range of motion.      Cervical back: Normal range of motion and neck supple.     Lymphadenopathy:     She has no cervical adenopathy.   Neurological: She is alert and oriented to person, place, and time.   Skin: Skin is warm and dry. Capillary refill takes less than 2 seconds.   Psychiatric: She has a normal mood and affect. Thought content normal.         ED Course   Lac Repair    Date/Time: 12/22/2024 5:05 PM    Performed by: Nancy Lara PA-C  Authorized by: Pancho Jimenez DO    Consent:     Consent obtained:  Verbal    Consent given by:  Patient    Risks, benefits, and alternatives were discussed: yes      Risks discussed:  Infection, pain, need for additional repair, poor cosmetic  result, tendon damage, poor wound healing and nerve damage    Alternatives discussed:  No treatment  Universal protocol:     Procedure explained and questions answered to patient or proxy's satisfaction: yes      Relevant documents present and verified: yes      Test results available: yes      Imaging studies available: yes      Required blood products, implants, devices, and special equipment available: yes      Site/side marked: yes      Immediately prior to procedure, a time out was called: yes      Patient identity confirmed:  Verbally with patient  Anesthesia:     Anesthesia method:  Local infiltration    Local anesthetic:  Lidocaine 1% w/o epi  Pre-procedure details:     Preparation:  Patient was prepped and draped in usual sterile fashion  Exploration:     Limited defect created (wound extended): no      Imaging outcome: foreign body not noted      Wound exploration: wound explored through full range of motion      Contaminated: no    Treatment:     Area cleansed with:  Saline and povidone-iodine    Amount of cleaning:  Standard    Irrigation solution:  Sterile water    Irrigation method:  Syringe    Visualized foreign bodies/material removed: no      Debridement:  None    Undermining:  None    Scar revision: no    Skin repair:     Repair method:  Sutures    Suture size:  4-0    Suture material:  Prolene    Suture technique:  Simple interrupted    Number of sutures:  4  Approximation:     Approximation:  Close  Repair type:     Repair type:  Simple  Post-procedure details:     Dressing:  Antibiotic ointment and adhesive bandage    Procedure completion:  Tolerated well, no immediate complications    Labs Reviewed - No data to display       Imaging Results    None          Medications   Tdap (BOOSTRIX) vaccine injection 0.5 mL (0.5 mLs Intramuscular Given 12/22/24 1726)   LIDOcaine (PF) 10 mg/ml (1%) injection 50 mg (50 mg Infiltration Given 12/22/24 1750)     Medical Decision Making  DDX: eyebrow laceration,  skin cellulitis, skin abrasion, among others    Yany Yuan is a 54 y.o. female who presents to the ED with complaints of a laceration to her R eyebrow that occurred just prior to arrival. She reports her puppy jumped up and its paw scratched her face. Not up to date on tetanus vaccine. Dog is up to date on vaccines. She is positive the dogs paw scratched her and not its tooth. Denies vision change.      Hospital course: Tdap updated today. Sutures placed in R eye brow. Patient tolerated well. Instructed patient to return to the ED in 7 days for suture removal. Strict return precautions given. Stable for discharge.     Risk  Prescription drug management.                                      Clinical Impression:  Final diagnoses:  [S01.111A] Eyebrow laceration, right, initial encounter (Primary)          ED Disposition Condition    Discharge Stable          ED Prescriptions    None       Follow-up Information       Follow up With Specialties Details Why Contact Info    Mary Navarro MD Family Medicine   [address]  [address]  [facility] Phys[facility] [address]  [phone]      [facility] - Emergency Dept Emergency Medicine In 1 week As needed, If symptoms worsen, For suture removal [address] [facility] [id number] [phone]             Nancy Lara PA-C  12/30/24 0201

## 2024-12-23 NOTE — DISCHARGE INSTRUCTIONS
Return to the ED in 7 days for suture removal.     It is important that you follow up with your primary care provider or specialist if indicated for further evaluation, workup, and treatment as necessary. The exam and treatment you received in Emergency Department was for an urgent problem and NOT INTENDED AS COMPLETE CARE. It is important that you FOLLOW UP with a doctor for ongoing care. If your symptoms become WORSE or you DO NOT IMPROVE and you are unable to reach your health care provider, you should RETURN to the Emergency Department.

## 2024-12-29 ENCOUNTER — HOSPITAL ENCOUNTER (EMERGENCY)
Facility: HOSPITAL | Age: 54
Discharge: HOME | End: 2024-12-29
Attending: EMERGENCY MEDICINE
Payer: MEDICAID

## 2024-12-29 VITALS
RESPIRATION RATE: 20 BRPM | DIASTOLIC BLOOD PRESSURE: 68 MMHG | HEART RATE: 72 BPM | TEMPERATURE: 98 F | OXYGEN SATURATION: 100 % | WEIGHT: 171.5 LBS | SYSTOLIC BLOOD PRESSURE: 117 MMHG | BODY MASS INDEX: 34.57 KG/M2 | HEIGHT: 59 IN

## 2024-12-29 DIAGNOSIS — Z48.02 ENCOUNTER FOR REMOVAL OF SUTURES: Primary | ICD-10-CM

## 2024-12-29 PROCEDURE — 99999 HC NO LEVEL OF SERVICE - ED ONLY: CPT

## 2024-12-29 NOTE — ED PROVIDER NOTES
Encounter Date: 2024       History     Chief Complaint   Patient presents with    Suture / Staple Removal     Requesting suture removal to right eyebrow. Have been in place x1 week. No pain.      Right eyebrow 4 sutures removed    The history is provided by the patient.   Suture / Staple Removal   The sutures were placed 7 to 10 days ago. There has been no treatment since the wound repair. There has been no drainage from the wound. There is no redness present. There is no swelling present. There is no pain present. She has no difficulty moving the affected extremity or digit.     Review of patient's allergies indicates:   Allergen Reactions    Amoxicillin Nausea And Vomiting    Tetracycline Rash    Tetracyclines Rash     Past Medical History:   Diagnosis Date    Bipolar disorder, unspecified     Depression     HTN (hypertension)      Past Surgical History:   Procedure Laterality Date    APPENDECTOMY      BACK SURGERY      CARPAL TUNNEL RELEASE Bilateral     HYSTERECTOMY      TUBAL LIGATION       Family History   Problem Relation Name Age of Onset    Colon cancer Mother              Hypertension Mother      Diabetes type II Father              Hypertension Father      No Known Problems Sister      No Known Problems Brother       Social History     Tobacco Use    Smoking status: Former     Types: Cigarettes    Smokeless tobacco: Never   Substance Use Topics    Alcohol use: Yes     Comment: rarely    Drug use: Never     Review of Systems   Constitutional: Negative.    HENT: Negative.     Eyes: Negative.    Respiratory: Negative.     Cardiovascular: Negative.    Gastrointestinal: Negative.    Genitourinary: Negative.    Musculoskeletal: Negative.    Skin:  Positive for wound.        Right eyebrow laceration with 4 sutures in place   Neurological: Negative.    All other systems reviewed and are negative.      Physical Exam     Initial Vitals [24 1107]   BP Pulse Resp Temp SpO2   117/68 72 20  97.9 °F (36.6 °C) 100 %      MAP       --         Physical Exam    Nursing note and vitals reviewed.  Constitutional: Vital signs are normal. She appears well-developed and well-nourished. She is not diaphoretic. She is cooperative.  Non-toxic appearance. She does not have a sickly appearance. She does not appear ill. No distress.   HENT:   Head: Normocephalic and atraumatic.   Right Ear: Hearing normal.   Left Ear: Hearing normal.   Nose: Nose normal. Mouth/Throat: Uvula is midline and oropharynx is clear and moist.   Eyes: Conjunctivae and EOM are normal. Pupils are equal, round, and reactive to light.   Neck: Trachea normal and phonation normal. Neck supple.   Normal range of motion.  Cardiovascular:  Normal rate, regular rhythm, normal heart sounds, intact distal pulses and normal pulses.           Pulmonary/Chest: Effort normal and breath sounds normal. No accessory muscle usage. No tachypnea and no bradypnea. No respiratory distress. She has no decreased breath sounds. She has no wheezes. She has no rhonchi. She has no rales.   Normal effort, symmetrical chest rise and fall, no accessory muscle use. Bilateral clear breath sounds in all fields anterior and posterior.      Abdominal: Abdomen is soft. Bowel sounds are normal. There is no abdominal tenderness.   Abdomen is soft, nontender, no peritoneal signs. Tolerating PO fluids.      Musculoskeletal:         General: Normal range of motion.      Cervical back: Normal range of motion and neck supple.     Neurological: She is alert and oriented to person, place, and time. She has normal strength. GCS score is 15. GCS eye subscore is 4. GCS verbal subscore is 5. GCS motor subscore is 6.   Skin: Skin is warm, dry and intact. Capillary refill takes less than 2 seconds. No pallor.        4 sutures in right eyebrow linear laceration, skin intact, no erythema, no discharge, no swelling   Psychiatric: She has a normal mood and affect. Thought content normal.       ED  Course   Suture Removal    Date/Time: 1/2/2025 9:12 PM  Location procedure was performed: [facility] EMERGENCY DEPARTMENT    Performed by: Keli Pereira FNP  Authorized by: Pancho Jimenez DO  Body area: head/neck  Location details: right eyebrow  Description of findings: linear laceration with 4 sutures, skin intact, well healed, no signs of infection   Wound Appearance: well healed, clean, nontender and no drainage  Sutures Removed: 4  Staples Removed: 0  Post-removal: dressing applied  Complications: No  Estimated blood loss (mL): 0  Specimens: No  Implants: No  Patient tolerance: Patient tolerated the procedure well with no immediate complications      Labs Reviewed - No data to display       Imaging Results    None          Medications - No data to display  Medical Decision Making  Cellulitis, abscess, mass, cyst among others    Suture removal, 4 sutures removed, skin intact, bandage applied.     The patient is a 54 y.o. female who presents to the [facility] Emergency Department with a chief complaint of suture removal.  EPIC records were reviewed.   The patient's vital signs and condition remained stable while undergoing evaluation in the Emergency Department. The patient agreed with the plan for care. The patient is nontoxic appearing, in no acute distress, with stable vital signs and resting comfortably. There is no objective evidence requiring urgent intervention or hospitalization. I provided counseling to the patient with regard to the medical condition, the treatment plan, specific conditions for return, and the importance of follow up. Detailed written and verbal instructions were provided to the patient and he/she expressed a verbal understanding of the discharge instructions and overall management plan. Reiterated the importance of medication administration and safety, advised the patient to follow up with primary care provider in 3-5 days or sooner if needed. All questions and concerns were addressed before  leaving the Emergency Department. The patient is stable for discharge.                                         Clinical Impression:  Final diagnoses:  [Z48.02] Encounter for removal of sutures (Primary)          ED Disposition Condition    Discharge Stable          ED Prescriptions    None       Follow-up Information       Follow up With Specialties Details Why Contact Info    Mary Navarro MD Family Medicine In 1 week  [address]  [address]  [facility] Physician [facility] [address]  [phone]      [facility] - Emergency Dept Emergency Medicine  If symptoms worsen [address] [facility] [id number] [phone]             Keli Pereira, KEVIN  01/02/25 2115

## 2025-04-09 ENCOUNTER — HOSPITAL ENCOUNTER (EMERGENCY)
Facility: HOSPITAL | Age: 55
Discharge: HOME | End: 2025-04-10
Attending: STUDENT IN AN ORGANIZED HEALTH CARE EDUCATION/TRAINING PROGRAM
Payer: MEDICAID

## 2025-04-09 VITALS
SYSTOLIC BLOOD PRESSURE: 149 MMHG | WEIGHT: 174 LBS | HEART RATE: 99 BPM | BODY MASS INDEX: 35.08 KG/M2 | HEIGHT: 59 IN | DIASTOLIC BLOOD PRESSURE: 94 MMHG | RESPIRATION RATE: 16 BRPM | TEMPERATURE: 98 F | OXYGEN SATURATION: 96 %

## 2025-04-09 DIAGNOSIS — I10 HYPERTENSION: ICD-10-CM

## 2025-04-09 LAB
ALBUMIN SERPL-MCNC: 4.8 G/DL (ref 3.5–5)
ALBUMIN/GLOB SERPL: 1.5 RATIO (ref 1.1–2)
ALP SERPL-CCNC: 108 UNIT/L (ref 40–150)
ALT SERPL-CCNC: 16 UNIT/L (ref 0–55)
ANION GAP SERPL CALC-SCNC: 12 MEQ/L
AST SERPL-CCNC: 21 UNIT/L (ref 11–45)
BASOPHILS # BLD AUTO: 0.02 X10(3)/MCL
BASOPHILS NFR BLD AUTO: 0.2 %
BILIRUB SERPL-MCNC: 0.3 MG/DL
BUN SERPL-MCNC: 14.9 MG/DL (ref 9.8–20.1)
CALCIUM SERPL-MCNC: 9.8 MG/DL (ref 8.4–10.2)
CHLORIDE SERPL-SCNC: 106 MMOL/L (ref 98–107)
CO2 SERPL-SCNC: 21 MMOL/L (ref 22–29)
CREAT SERPL-MCNC: 0.79 MG/DL (ref 0.55–1.02)
CREAT/UREA NIT SERPL: 19
EOSINOPHIL # BLD AUTO: 0.08 X10(3)/MCL (ref 0–0.9)
EOSINOPHIL NFR BLD AUTO: 0.7 %
ERYTHROCYTE [DISTWIDTH] IN BLOOD BY AUTOMATED COUNT: 12.6 % (ref 11.5–17)
GFR SERPLBLD CREATININE-BSD FMLA CKD-EPI: >60 ML/MIN/1.73/M2
GLOBULIN SER-MCNC: 3.1 GM/DL (ref 2.4–3.5)
GLUCOSE SERPL-MCNC: 125 MG/DL (ref 74–100)
HCT VFR BLD AUTO: 45.1 % (ref 37–47)
HGB BLD-MCNC: 14.8 G/DL (ref 12–16)
IMM GRANULOCYTES # BLD AUTO: 0.03 X10(3)/MCL (ref 0–0.04)
IMM GRANULOCYTES NFR BLD AUTO: 0.3 %
LYMPHOCYTES # BLD AUTO: 1.72 X10(3)/MCL (ref 0.6–4.6)
LYMPHOCYTES NFR BLD AUTO: 16.1 %
MCH RBC QN AUTO: 29.2 PG (ref 27–31)
MCHC RBC AUTO-ENTMCNC: 32.8 G/DL (ref 33–36)
MCV RBC AUTO: 89 FL (ref 80–94)
MONOCYTES # BLD AUTO: 0.37 X10(3)/MCL (ref 0.1–1.3)
MONOCYTES NFR BLD AUTO: 3.5 %
NEUTROPHILS # BLD AUTO: 8.47 X10(3)/MCL (ref 2.1–9.2)
NEUTROPHILS NFR BLD AUTO: 79.2 %
NRBC BLD AUTO-RTO: 0 %
PLATELET # BLD AUTO: 318 X10(3)/MCL (ref 130–400)
PMV BLD AUTO: 9.5 FL (ref 7.4–10.4)
POTASSIUM SERPL-SCNC: 3.9 MMOL/L (ref 3.5–5.1)
PROT SERPL-MCNC: 7.9 GM/DL (ref 6.4–8.3)
RBC # BLD AUTO: 5.07 X10(6)/MCL (ref 4.2–5.4)
SODIUM SERPL-SCNC: 139 MMOL/L (ref 136–145)
TROPONIN I SERPL-MCNC: <0.01 NG/ML (ref 0–0.04)
WBC # BLD AUTO: 10.69 X10(3)/MCL (ref 4.5–11.5)

## 2025-04-09 PROCEDURE — 80053 COMPREHEN METABOLIC PANEL: CPT

## 2025-04-09 PROCEDURE — 93005 ELECTROCARDIOGRAM TRACING: CPT

## 2025-04-09 PROCEDURE — 99285 EMERGENCY DEPT VISIT HI MDM: CPT | Mod: 25

## 2025-04-09 PROCEDURE — 25000003 PHARM REV CODE 250: Performed by: STUDENT IN AN ORGANIZED HEALTH CARE EDUCATION/TRAINING PROGRAM

## 2025-04-09 PROCEDURE — 84484 ASSAY OF TROPONIN QUANT: CPT

## 2025-04-09 PROCEDURE — 85025 COMPLETE CBC W/AUTO DIFF WBC: CPT

## 2025-04-09 PROCEDURE — 93010 ELECTROCARDIOGRAM REPORT: CPT | Mod: ,,, | Performed by: INTERNAL MEDICINE

## 2025-04-09 PROCEDURE — 25000003 PHARM REV CODE 250

## 2025-04-09 RX ORDER — CLONIDINE HYDROCHLORIDE 0.1 MG/1
0.1 TABLET ORAL
Status: COMPLETED | OUTPATIENT
Start: 2025-04-09 | End: 2025-04-09

## 2025-04-09 RX ORDER — HYDROCHLOROTHIAZIDE 12.5 MG/1
12.5 TABLET ORAL DAILY
Qty: 30 TABLET | Refills: 0 | Status: SHIPPED | OUTPATIENT
Start: 2025-04-09 | End: 2025-05-09

## 2025-04-09 RX ORDER — AMLODIPINE BESYLATE 10 MG/1
10 TABLET ORAL DAILY
Qty: 30 TABLET | Refills: 0 | Status: SHIPPED | OUTPATIENT
Start: 2025-04-09 | End: 2025-05-09

## 2025-04-09 RX ORDER — AMLODIPINE BESYLATE 5 MG/1
5 TABLET ORAL
Status: COMPLETED | OUTPATIENT
Start: 2025-04-09 | End: 2025-04-09

## 2025-04-09 RX ADMIN — CLONIDINE HYDROCHLORIDE 0.1 MG: 0.1 TABLET ORAL at 07:04

## 2025-04-09 RX ADMIN — AMLODIPINE BESYLATE 5 MG: 5 TABLET ORAL at 11:04

## 2025-04-10 LAB
OHS QRS DURATION: 76 MS
OHS QTC CALCULATION: 422 MS

## 2025-04-10 NOTE — ED PROVIDER NOTES
Encounter Date: 2025    SCRIBE #1 NOTE: I, Louis Zamora, am scribing for, and in the presence of,  Nathan Day MD. I have scribed the following portions of the note - Other sections scribed: HPI,ROS,PE.       History     Chief Complaint   Patient presents with    Hypertension     C/O hypertension and mild blurred vision - onset 1600. Hx HTN. Has not taken 5mg amlodipine x 1-2 weeks, states she ran out. Reports feeling anxious in triage.      Patient is a 53 y/o female with PMHx of HTN, depression, and Bipolar disorder presents to ED c/o hypertension onset ~16:00 on . Pt reports she started having a headache on onset and states her blood pressure was 219/134. She denies any chest pain, shortness of breath, nausea, or vomiting. She reports she is on 10mg Amlodipine. She reports she hasn't had her Amlodipine filled by her pharmacy since February, and states she was unaware of this until recently.     PCP: Mary Navarro MD    The history is provided by the patient.     Review of patient's allergies indicates:   Allergen Reactions    Amoxicillin Nausea And Vomiting    Tetracycline Rash    Tetracyclines Rash     Past Medical History:   Diagnosis Date    Bipolar disorder, unspecified     Depression     HTN (hypertension)      Past Surgical History:   Procedure Laterality Date    APPENDECTOMY      BACK SURGERY      CARPAL TUNNEL RELEASE Bilateral     HYSTERECTOMY      TUBAL LIGATION       Family History   Problem Relation Name Age of Onset    Colon cancer Mother              Hypertension Mother      Diabetes type II Father              Hypertension Father      No Known Problems Sister      No Known Problems Brother       Social History[1]  Review of Systems   Constitutional:  Negative for fever.   HENT:  Negative for sore throat.    Eyes:  Negative for visual disturbance.   Respiratory:  Negative for shortness of breath.    Cardiovascular:  Negative for chest pain.        HTN   Gastrointestinal:   Negative for abdominal pain.   Genitourinary:  Negative for dysuria.   Musculoskeletal:  Negative for joint swelling.   Skin:  Negative for rash.   Neurological:  Positive for headaches. Negative for weakness.   Psychiatric/Behavioral:  Negative for confusion.        Physical Exam     Initial Vitals [04/09/25 1909]   BP Pulse Resp Temp SpO2   (!) 209/98 108 16 98 °F (36.7 °C) 95 %      MAP       --         Physical Exam    Nursing note and vitals reviewed.  Constitutional: She appears well-developed and well-nourished.   HENT:   Head: Normocephalic and atraumatic.   Eyes: EOM are normal. Pupils are equal, round, and reactive to light.   Neck:   Normal range of motion.  Cardiovascular:  Normal rate, regular rhythm, normal heart sounds and intact distal pulses.           No murmur heard.  Pulmonary/Chest: Breath sounds normal. No respiratory distress. She has no wheezes. She has no rales.   Abdominal: Abdomen is soft. She exhibits no distension. There is no abdominal tenderness. There is no rebound.   Musculoskeletal:         General: No tenderness or edema. Normal range of motion.      Cervical back: Normal range of motion.     Neurological: She is alert. She has normal strength. No cranial nerve deficit. GCS score is 15.   Skin: Skin is warm and dry. Capillary refill takes less than 2 seconds. No rash noted. No erythema.   Psychiatric: She has a normal mood and affect.         ED Course   Procedures  Labs Reviewed   COMPREHENSIVE METABOLIC PANEL - Abnormal       Result Value    Sodium 139      Potassium 3.9      Chloride 106      CO2 21 (*)     Glucose 125 (*)     Blood Urea Nitrogen 14.9      Creatinine 0.79      Calcium 9.8      Protein Total 7.9      Albumin 4.8      Globulin 3.1      Albumin/Globulin Ratio 1.5      Bilirubin Total 0.3            ALT 16      AST 21      eGFR >60      Anion Gap 12.0      BUN/Creatinine Ratio 19     CBC WITH DIFFERENTIAL - Abnormal    WBC 10.69      RBC 5.07      Hgb 14.8       Hct 45.1      MCV 89.0      MCH 29.2      MCHC 32.8 (*)     RDW 12.6      Platelet 318      MPV 9.5      Neut % 79.2      Lymph % 16.1      Mono % 3.5      Eos % 0.7      Basophil % 0.2      Imm Grans % 0.3      Neut # 8.47      Lymph # 1.72      Mono # 0.37      Eos # 0.08      Baso # 0.02      Imm Gran # 0.03      NRBC% 0.0     TROPONIN I - Normal    Troponin-I <0.010     CBC W/ AUTO DIFFERENTIAL    Narrative:     The following orders were created for panel order CBC auto differential.  Procedure                               Abnormality         Status                     ---------                               -----------         ------                     CBC with Differential[[id number]]       Abnormal            Final result                 Please view results for these tests on the individual orders.     EKG Readings: (Independently Interpreted)   Initial Reading: No STEMI. Rhythm: Normal Sinus Rhythm. Heart Rate: 95. Ectopy: No Ectopy. Conduction: Normal. ST Segments: Normal ST Segments. T Waves: Normal. Axis: Normal.   Taken at 19:13     ECG Results              EKG 12-lead (Final result)        Collection Time Result Time QRS Duration OHS QTC Calculation    04/09/25 19:13:43 04/10/25 12:34:24 76 422                     Final result by Interface, [facility] (04/10/25 12:34:26)                   Narrative:    Test Reason : I10,    Vent. Rate :  95 BPM     Atrial Rate :  95 BPM     P-R Int : 120 ms          QRS Dur :  76 ms      QT Int : 336 ms       P-R-T Axes :  57  29  45 degrees    QTcB Int : 422 ms    Sinus rhythm with marked sinus arrythmia  No significant change was found  Confirmed by Vik Starks ([id number]) on 4/10/2025 12:34:23 PM    Referred By:            Confirmed By: Vik Starks                                     EKG 12-lead (Final result)  Result time 04/15/25 07:32:00      Final result by Unknown User (04/15/25 07:32:00)                                      Imaging Results              CT  Head Without Contrast (Final result)  Result time 04/10/25 06:21:56      Final result by Edil Hu MD (04/10/25 06:21:56)                   Impression:      No acute intracranial pathology.    I agree with the preliminary report.      Electronically signed by: Eidl Hu  Date:    04/10/2025  Time:    06:21               Narrative:    EXAMINATION:  CT HEAD WITHOUT CONTRAST    CLINICAL HISTORY:  Headache, new or worsening (Age >= 50y); blurry vision.    TECHNIQUE:  Low dose axial images were obtained through the head.  Coronal and sagittal reformations were also performed. Contrast was not administered.  Dose reduction techniques including automatic exposure control (AEC) were utilized.    Dose (DLP): 940 mGycm    COMPARISON:  None.    FINDINGS:  INTRACRANIAL: Brain parenchyma demonstrates normal attenuation and configuration.  No acute intracranial hemorrhage.  No hydrocephalus.  No intracranial mass effect.    SINUSES: Visualized paranasal sinuses and mastoids are clear.    SKULL/SCALP: Visualized osseous structures are normal.    ORBITS: Visualized orbits are normal.                        Preliminary result by Keon Matson Jr., MD (04/09/25 20:05:24)                   Impression:    1. No acute intracranial process identified. Details and other findings as noted above.               Narrative:    START OF REPORT:  Technique: CT of the head was performed without intravenous contrast with axial as well as coronal and sagittal images.    Comparison: None.    Dosage Information: Automated exposure control was utilized.    Clinical history: Headache. C/O hypertension and mild blurred vision - onset 1600. Hx HTN.    Findings:  Hemorrhage: No acute intracranial hemorrhage is seen.  CSF spaces: The ventricles sulci and basal cisterns are within normal limits.  Brain parenchyma: Unremarkable with preservation of the grey white junction throughout.  Cerebellum: Unremarkable.  Vascular: Atheromatous  calcification of the intracranial arteries is seen.  Sella and skull base: The sella appears to be within normal limits for age.  Intracranial calcifications: Incidental note is made of some pineal region calcification.  Calvarium: Incidental note is made of right choroid plexus calcification.    Maxillofacial Structures:  Paranasal sinuses: The visualized paranasal sinuses appear clear with no mucoperiosteal thickening or air fluid levels identified.  Orbits: The orbits appear unremarkable.  Zygomatic arches: The zygomatic arches are intact and unremarkable.  Temporal bones and mastoids: The temporal bones and mastoids appear unremarkable.  TMJ: The mandibular condyles appear normally placed with respect to the mandibular fossa.    Visualized upper cervical spine: The visualized cervical spine appears unremarkable.                                         Medications   cloNIDine tablet 0.1 mg (0.1 mg Oral Given 4/9/25 1914)   amLODIPine tablet 5 mg (5 mg Oral Given 4/9/25 2357)     Medical Decision Making  Judging by the patient's chief complaint and pertinent history, the patient has the following possible differential diagnoses, including but not limited to the following.  Some of these are deemed to be lower likelihood and some more likely based on my physical exam and history combined with possible lab work and/or imaging studies.   Please see the pertinent studies, and refer to the HPI.  Some of these diagnoses will take further evaluation to fully rule out, perhaps as an outpatient and the patient was encouraged to follow up when discharged for more comprehensive evaluation.    Hypertensive urgency, hypertensive emergency, hypertensive encephalopathy, intracerebral hemorrhage, acute heart failure, myocardial ischemia, pulmonary edema, nephropathy, renal failure, proteinuria, electrolyte abnormalities, asymptomatic elevated blood pressure, chronic hypertension, medication non-compliance    Patient is a  54-year-old who presents to the emergency department complaining of high blood pressure.  See HPI.  See physical exam.  Labs and imaging as noted.  CT of the head without any acute abnormalities.  EKG without ST elevation.  Labs as noted.  No evidence of end-organ damage.  On reassessment patient resting comfortably, no acute distress.  Will refill amlodipine will also add HCTZ.  Discussed need for follow-up with primary care.  Discussed need for follow-up with cardiology.  Discussed return precautions.  Answered all questions at this time.  Hemodynamically stable for continued outpatient management strict return precautions.  Patient verbalized understanding and agreed to plan.    Problems Addressed:  Hypertension: acute illness or injury that poses a threat to life or bodily functions    Amount and/or Complexity of Data Reviewed  External Data Reviewed: ECG and notes.  Labs: ordered.  Radiology: ordered and independent interpretation performed.  ECG/medicine tests: ordered and independent interpretation performed.     Details: Initial Reading: No STEMI. Rhythm: Normal Sinus Rhythm. Heart Rate: 95. Ectopy: No Ectopy. Conduction: Normal. ST Segments: Normal ST Segments. T Waves: Normal. Axis: Normal.   Taken at 19:13     Risk  Prescription drug management.            Scribe Attestation:   Scribe #1: I performed the above scribed service and the documentation accurately describes the services I performed. I attest to the accuracy of the note.    Attending Attestation:           Physician Attestation for Scribe:  Physician Attestation Statement for Scribe #1: I, Nathan Day MD, reviewed documentation, as scribed by Louis Zamora in my presence, and it is both accurate and complete.                                    Clinical Impression:  Final diagnoses:  [I10] Hypertension          ED Disposition Condition    Discharge Stable          ED Prescriptions       Medication Sig Dispense Start Date End Date Auth.  Provider    hydroCHLOROthiazide 12.5 MG Tab Take 1 tablet (12.5 mg total) by mouth once daily. 30 tablet 4/9/2025 5/9/2025 Nathan Day MD    amLODIPine (NORVASC) 10 MG tablet Take 1 tablet (10 mg total) by mouth once daily. 30 tablet 4/9/2025 5/9/2025 Nathan Day MD          Follow-up Information       Follow up With Specialties Details Why Contact Info    Mary Navarro MD Family Medicine   [address]  [address]  [facility] [address]  [phone]      Primary Care  Call in 1 day  Please call [phone] for a primary care provider.               [1]   Social History  Tobacco Use    Smoking status: Former     Types: Cigarettes    Smokeless tobacco: Never   Substance Use Topics    Alcohol use: Yes     Comment: rarely    Drug use: Never        Nathan Day MD  04/27/25 [id number]

## 2025-04-10 NOTE — FIRST PROVIDER EVALUATION
"Medical screening examination initiated.  I have conducted a focused provider triage encounter, findings are as follows:    Brief history of present illness:  arrived to ED due to hypertension. States she has not had medication in 1-2 weeks. C/o blurred vision and headache that began today. Noticed her BP was high on today. Currently on Amlodipine 5 mg daily.     Vitals:    04/09/25 1909   BP: (!) 209/98   BP Location: Left arm   Pulse: 108   Resp: 16   Temp: 98 °F (36.7 °C)   TempSrc: Oral   SpO2: 95%   Weight: 78.9 kg (174 lb)   Height: 4' 11" (1.499 m)       Pertinent physical exam:  awake, alert, has non-labored breathing, ambulatory into triage, anxious.     Brief workup plan:  labs, EKG, imaging, medication     Preliminary workup initiated; this workup will be continued and followed by the physician or advanced practice provider that is assigned to the patient when roomed.  "

## 2025-04-10 NOTE — DISCHARGE INSTRUCTIONS
Follow-up with your primary care physician.    Continue to monitor blood pressure.  Resume taking your amlodipine 10 mg daily.      If your pressure remains elevated you may began taking HCTZ 12.5 mg daily.      Return to the emergency department for any new or worsening symptoms, chest pain, shortness of breath, nausea, vomiting, difficulty breathing, fever, headache, or any other concerns.

## 2025-05-09 DIAGNOSIS — G89.29 CHRONIC THUMB PAIN, RIGHT: Primary | ICD-10-CM

## 2025-05-09 DIAGNOSIS — M79.644 CHRONIC THUMB PAIN, RIGHT: Primary | ICD-10-CM

## 2025-06-25 ENCOUNTER — OFFICE VISIT (OUTPATIENT)
Dept: ORTHOPEDICS | Facility: CLINIC | Age: 55
End: 2025-06-25
Payer: MEDICAID

## 2025-06-25 ENCOUNTER — HOSPITAL ENCOUNTER (OUTPATIENT)
Dept: RADIOLOGY | Facility: HOSPITAL | Age: 55
Discharge: HOME | End: 2025-06-25
Attending: NURSE PRACTITIONER
Payer: MEDICAID

## 2025-06-25 VITALS
TEMPERATURE: 99 F | HEART RATE: 89 BPM | OXYGEN SATURATION: 100 % | SYSTOLIC BLOOD PRESSURE: 137 MMHG | WEIGHT: 176.38 LBS | RESPIRATION RATE: 20 BRPM | DIASTOLIC BLOOD PRESSURE: 89 MMHG | BODY MASS INDEX: 35.56 KG/M2 | HEIGHT: 59 IN

## 2025-06-25 DIAGNOSIS — M79.644 CHRONIC THUMB PAIN, RIGHT: ICD-10-CM

## 2025-06-25 DIAGNOSIS — M18.11 ARTHRITIS OF CARPOMETACARPAL (CMC) JOINT OF RIGHT THUMB: Primary | ICD-10-CM

## 2025-06-25 DIAGNOSIS — R20.2 NUMBNESS AND TINGLING IN RIGHT HAND: ICD-10-CM

## 2025-06-25 DIAGNOSIS — R20.0 NUMBNESS AND TINGLING IN RIGHT HAND: ICD-10-CM

## 2025-06-25 DIAGNOSIS — G89.29 CHRONIC THUMB PAIN, RIGHT: ICD-10-CM

## 2025-06-25 PROCEDURE — 99203 OFFICE O/P NEW LOW 30 MIN: CPT | Mod: S$PBB,,, | Performed by: NURSE PRACTITIONER

## 2025-06-25 PROCEDURE — 4010F ACE/ARB THERAPY RXD/TAKEN: CPT | Mod: CPTII,,, | Performed by: NURSE PRACTITIONER

## 2025-06-25 PROCEDURE — 3079F DIAST BP 80-89 MM HG: CPT | Mod: CPTII,,, | Performed by: NURSE PRACTITIONER

## 2025-06-25 PROCEDURE — 73130 X-RAY EXAM OF HAND: CPT | Mod: TC,RT

## 2025-06-25 PROCEDURE — 1160F RVW MEDS BY RX/DR IN RCRD: CPT | Mod: CPTII,,, | Performed by: NURSE PRACTITIONER

## 2025-06-25 PROCEDURE — 1159F MED LIST DOCD IN RCRD: CPT | Mod: CPTII,,, | Performed by: NURSE PRACTITIONER

## 2025-06-25 PROCEDURE — 3075F SYST BP GE 130 - 139MM HG: CPT | Mod: CPTII,,, | Performed by: NURSE PRACTITIONER

## 2025-06-25 PROCEDURE — 3008F BODY MASS INDEX DOCD: CPT | Mod: CPTII,,, | Performed by: NURSE PRACTITIONER

## 2025-06-25 PROCEDURE — 99214 OFFICE O/P EST MOD 30 MIN: CPT | Mod: PBBFAC,25 | Performed by: NURSE PRACTITIONER

## 2025-07-21 ENCOUNTER — CLINICAL SUPPORT (OUTPATIENT)
Dept: ORTHOPEDICS | Facility: CLINIC | Age: 55
End: 2025-07-21
Payer: MEDICAID

## 2025-07-21 VITALS
WEIGHT: 176.38 LBS | HEIGHT: 59 IN | HEART RATE: 87 BPM | SYSTOLIC BLOOD PRESSURE: 110 MMHG | BODY MASS INDEX: 35.56 KG/M2 | DIASTOLIC BLOOD PRESSURE: 79 MMHG | TEMPERATURE: 99 F | OXYGEN SATURATION: 96 %

## 2025-07-21 DIAGNOSIS — M18.11 ARTHRITIS OF CARPOMETACARPAL (CMC) JOINT OF RIGHT THUMB: Primary | ICD-10-CM

## 2025-07-21 PROCEDURE — 99214 OFFICE O/P EST MOD 30 MIN: CPT | Mod: S$PBB,,, | Performed by: ORTHOPAEDIC SURGERY

## 2025-07-21 PROCEDURE — 99215 OFFICE O/P EST HI 40 MIN: CPT | Mod: PBBFAC

## 2025-07-21 RX ORDER — MUPIROCIN 20 MG/G
OINTMENT TOPICAL
OUTPATIENT
Start: 2025-07-21

## 2025-07-21 RX ORDER — LOSARTAN POTASSIUM 50 MG/1
50 TABLET ORAL
COMMUNITY

## 2025-07-21 RX ORDER — SODIUM CHLORIDE 9 MG/ML
INJECTION, SOLUTION INTRAVENOUS CONTINUOUS
OUTPATIENT
Start: 2025-07-21

## 2025-07-21 RX ORDER — RANOLAZINE 500 MG/1
500 TABLET, EXTENDED RELEASE ORAL 2 TIMES DAILY
COMMUNITY

## 2025-07-21 RX ORDER — CEFAZOLIN SODIUM 2 G/50ML
2 SOLUTION INTRAVENOUS
OUTPATIENT
Start: 2025-07-21

## 2025-07-21 RX ORDER — BUPROPION HYDROCHLORIDE 300 MG/1
300 TABLET ORAL EVERY MORNING
COMMUNITY
Start: 2025-07-03

## 2025-07-21 RX ORDER — TIZANIDINE 4 MG/1
4 TABLET ORAL NIGHTLY
COMMUNITY

## 2025-07-21 RX ORDER — MELOXICAM 15 MG/1
TABLET ORAL
COMMUNITY
Start: 2024-01-01

## 2025-07-21 NOTE — PROGRESS NOTES
[facility] Progress Note     In brief, 54 y.o. female with right 1st CMC joint arthritis       HPI:   54 RHD female with right 1st CMC arthritis. She has had pain for the last 4 years. She has tried bracing and injections in the past to some relief of her symptoms. She is starting to drop objects. Her pain occasionally wakes her up at night. She wants definitive treatment. She was previously scheduled for surgery by an outside provider, but had to cancel. She has otherwise done a lot of research and is ready for surgery.    No significant PMH  Past b/l carpal tunnel and cubital tunnel releases  No nicotine  No ETOH    PMH:   Past Medical History:   Diagnosis Date    Bipolar disorder, unspecified     Depression     HTN (hypertension)        PSH:   Past Surgical History:   Procedure Laterality Date    APPENDECTOMY      BACK SURGERY      CARPAL TUNNEL RELEASE Bilateral     HYSTERECTOMY      SPINE SURGERY  2021    TUBAL LIGATION         SH: Social History[1]    FH:   Family History   Problem Relation Name Age of Onset    Colon cancer Mother Sarita             Hypertension Mother Sarita     Arthritis Mother Sarita     Cancer Mother Sarita     Mental illness Mother Sarita     Diabetes type II Father Jeromy             Hypertension Father Jeromy     Hearing loss Father Jeromy     Heart disease Father Jeromy     Alcohol abuse Sister Roopa     Birth defects Sister Roopa         Born with 3 thumbs    Hearing loss Brother Missael     Alcohol abuse Maternal Grandfather ??     Arthritis Paternal Grandmother Sophy     Cancer Paternal Grandmother Sophy     Hearing loss Paternal Grandmother Sophy     Alcohol abuse Brother Serafin     Mental illness Brother Serafin     Cancer Paternal Uncle Bart     Heart disease Paternal Uncle Gene     Stroke Paternal Uncle Gene     Alcohol abuse Brother Serafin     Mental illness Brother Serafin     Cancer Paternal Uncle Bart     Heart disease Paternal Uncle  Gene     Stroke Paternal Uncle Gene        Allergies:   Review of patient's allergies indicates:   Allergen Reactions    Amoxicillin Nausea And Vomiting    Tetracycline Rash    Tetracyclines Rash        Physical Exam:    Vitals:    07/21/25 1314   BP: 110/79   Pulse: 87   Temp: 98.7 °F (37.1 °C)       General: NAD  Cardio: Regular rate by peripheral pulse   Pulm: Normal WOB on room air, symmetric chest expansion  Abd: Soft, NT/ND  Psych: normal affect/mood  Neuro: A&O    RUE  No gross deformity. Faded carpal tunnel and cubital tunnel scars  TTP over 1st CMC joint  Positive basilar grind  Motor and sensory intact  Able to make full fist and fully extend fingers  2+ radial pulse    Imaging:  XR right hand: severe arthritis of the 1st CMC joint.      Assessment/Plan:  54 RHD y.o. female with right 1st CMC joint arthritis that has failed 4 years of nonoperative treatment.    Plan for right 1st CMC suspension arthroplasty with tightrope and FCR tendon interposition  Activity as tolerated  Follow up for surgery July 31st with Dr. Caro Araiza MD  [facility], PGY-5  7/21/2025 6:43 PM         [1]   Social History  Socioeconomic History    Marital status: Single   Tobacco Use    Smoking status: Former     Current packs/day: 1.00     Average packs/day: 1 pack/day for 30.0 years (30.0 ttl pk-yrs)     Types: Cigarettes, Vaping w/o nicotine    Smokeless tobacco: Never   Substance and Sexual Activity    Alcohol use: Not Currently     Alcohol/week: 1.0 standard drink of alcohol     Types: 1 Drinks containing 0.5 oz of alcohol per week     Comment: Not even once a week    Drug use: Never    Sexual activity: Yes     Partners: Male     Birth control/protection: None   Social History Narrative    ** Merged History Encounter **          Social Drivers of Health     Financial Resource Strain: Medium Risk (2/8/2024)    Received from [facility] and Its Subsidiaries and  Affiliates    Overall Financial Resource Strain (CARDIA)     Difficulty of Paying Living Expenses: Somewhat hard   Food Insecurity: No Food Insecurity (2/8/2024)    Received from [facility] and Its Subsidiaries and Affiliates    Hunger Vital Sign     Worried About Running Out of Food in the Last Year: Never true     Ran Out of Food in the Last Year: Never true   Transportation Needs: Unmet Transportation Needs (2/8/2024)    Received from [facility] and Its Subsidiaries and Affiliates    PRAPARE - Transportation     Lack of Transportation (Medical): Yes     Lack of Transportation (Non-Medical): Yes   Physical Activity: Sufficiently Active (2/8/2024)    Received from [facility] and Its Subsidiaries and Affiliates    Exercise Vital Sign     Days of Exercise per Week: 5 days     Minutes of Exercise per Session: 150+ min   Stress: Stress Concern Present (2/8/2024)    Received from [facility] and Its Subsidiaries and Affiliates    [employer] - Occupational Stress Questionnaire     Feeling of Stress : Very much   Housing Stability: High Risk (2/8/2024)    Received from [facility] and Its Subsidiaries and Affiliates    Housing Stability Vital Sign     Unable to Pay for Housing in the Last Year: Yes     Number of Places Lived in the Last Year: 1     Unstable Housing in the Last Year: No

## 2025-07-21 NOTE — LETTER
[facility]  [address] [address]  Phone: [phone]       Yany Ramosx   1970 07/21/2025     How to Prepare for Surgery (Orthopedic)  About this topic   There are some things that are common for most kinds of surgery. These help to keep you safe. Learn what you can do to get ready for your surgery. This will help you and the team caring for you during your surgery. Also, learn about the things the doctor and nurses do to keep you safe during surgery.  You may have outpatient surgery where you come in the day of your surgery and then go home hours after your surgery.  In other cases, you may be admitted to the hospital the day before your surgery, or you may have to stay in the hospital after your surgery.  Who will contact you before your surgery   Anesthesiology may call you to speak with you about the surgery and to see if you ever had any issues with anesthesiology before.   Preop nurse will call to go over your medical history with you 5 - 7 days before your surgery.  Their number is [phone].   Daytime surgery will call the day before between 3 pm and 6 pm to give you the time to arrive for your surgery. Their number is [phone].   General   Weeks before your surgery:  These are things you can do to lower your chances of having problems after your surgery:  Stop smoking if you are a smoker. Avoid being around others who smoke for several weeks before and after your surgery. Smoking limits how much oxygen gets to your body for healing.  If you have diabetes, keep your blood sugars as near normal as you can. This helps lower your chance of infection or other problems after your surgery.  Talk to your doctor about all the drugs you take. This includes over-the-counter drugs, natural products, and vitamins.  There may be some you can take the day of your surgery. If you have diabetes, talk to your doctor about your drugs, especially if you are  on insulin. Your doctor may tell you to take less than usual on the day of your surgery.  If you take insulin or any diabetic medication do not take the morning of surgery. __________________________________________________________________________________  Your doctor may want you to stop taking some of your drugs before surgery. Some drugs and natural products make it harder for your blood to clot. Then, you may have more bleeding during or after surgery. Be sure to tell your doctor if you are taking any drugs that may cause bleeding.   Common Medication Perioperative Consideration Stop prior to surgery   Aspirin Risk for Bleeding 7 days   NSAID's (antiinflammatories) Examples: Mobic/Meloxicam  Advil/Motrin/Ibuprofen/Aleve  Naproixen/Naprosyn  Voltaren/Diclofenac/Arthotec  /Celebrex/Celecoxib  /Toradol/Ketorolac  Ralafen/Nabumetone/Arthotec Risk for Bleeding 7 days   Phentermine/Diet Pills  Anesthesia, BP, Cardiac Side Effects 7 days   GIP/Glp-1 Agonist  Examples: Trulicity (Dulaglutide) Ozempic/Wegovy (Semaglutide) Victoza/Saxenda (liraglutide) Mounjaro (Trizepatide) Delayed Gastric Emptying causing Increase risk of aspiration in surgery 14 days   Omega-3/Fish oil Increase risk for bleeding 7 days    Medication that needs to be stopped ____________________________________________________________________________________________________________________________________________________________________________________________  Herbal Supplements you should stop     Herbal Supplement Operative Concerns Stop before Surgery   Echinacea Can be associated with allergic reaction 7 days   Ephedra (Mahung) Increase blood pressure and heart rate with anesthesia 7 days   All Vitamins, CBD supplement, Garlic, Anne, Tumeric,Umary Increase risk of bleeding 7 days   Gingko Biloba Increase risk of bleeding 7 days   Ginseng Low Glucose, decrease effects of coumadin 7 days   Kava Increased sedation with anesthesia  7 days   [place]  Wort Multiple drug interaction with coumadin, steroids, and some heart medication 7 days   Valerian Increase sedation Taper dose  14 days before surgery   Phentermine Cardiac side effects 7 days   Fenfluramine (Phen Phen) Cardiac side effects 7 days      Have the tests done that your doctor orders before your surgery. Your doctor may want you to have lab tests, an x-ray, or EKG to see how healthy you are. Having these tests helps the doctor plan for your care during surgery.  Mild exercise like walking, riding a bike, or swimming may be good for you. It may help you breathe more easily before and after surgery. Ask your doctor if it is OK for you to exercise before surgery. Also, practice taking deep breaths. Often after surgery, you will be asked to take deep breaths and cough. This may help prevent lung infections.  Follow a healthy diet and eat nutritious, well-balanced meals. Eat a healthy meal for supper the day before your surgery. Avoid beer, wine, and mixed drinks (alcohol).  You will not be allowed to drive right away after surgery. Ask a family member or a friend to drive you home.  Most often your doctor will want you to have an adult stay with you for at least 24 hours. Arrange with family or friends to stay with you on the first day after your surgery or when you go home.  If you get sick with a cold, infection, or other illness in the 2 weeks before your surgery, call your doctor's office. You may need to change when you have surgery because you may be more at risk for infection or other problems.  Check your skin for rashes, cuts, insect bites, or any skin infections and report these to your doctor before surgery. Pay attention to the areas that you cannot see easily, such as the bottoms of your feet and back. Check in skin folds for any bumps or skin rashes.  Shaving should be stopped at least 2 days before surgery on all areas of the body including the face, legs, underarms, etc.   Day before and  morning of your surgery:  Daytime surgery will call the day before between 3 pm and 6 pm to give you the time to arrive for your surgery. Their number is [phone].    Wash your hair and take a bath or shower before your surgery (around 7 pm). The doctor may give you special soap or wipes to wash with before the surgery to lessen the germs on your skin. Follow the directions how to use this special soap or wipes provided by your doctor. Your skin should be completely dry and cool. When applied to sensitive skin, the cloths may irritate the skin such as temporary itching sensation and /or redness.  If itching or redness persists, rinse affected area and discontinue use.  Clean skin in the following order using one cloth for each step.   AVOID CONTACT WITH EYES, EARS, MOUTH,AND MUCOUS MEMBRANES (VAGINAL OR RECTAL).                     YOU MUST USE ALL OF THE CLOTHS  Cloth #1  Wipe YOUR Neck and chest.  Cloth #2  Wipe both arms to fingertips and both armpits.  Cloth #3  Wipe both hips followed by groin area. Be sure to wipe the folds of your stomach and groin.  DO NOT use on vaginal and rectal areas.  Cloth #4  Wipe both legs starting at the thigh and ending at the toes.  Cloth #5  Wipe your back.  Cloth #6  Wipe your buttocks.  Do not use body lotions, perfumes, powders, or creams on your skin, especially near the surgical site. Do not wear makeup, especially eye makeup.  You will also need to take off nail polish and jewelry. Remove any jewelry from body piercings.  Stop eating and drinking at the time you are told to. This helps to make sure that your stomach is empty while you are under anesthesia.  Brush your teeth before your surgery. Take extra care not to drink any water while you brush. If your child is having surgery, watch that your child does not drink any water while they brush.  Leave all valuables and jewelry at home.  What to expect when you arrive for surgery:  At the hospital or surgery center, the  staff will work to get you ready for your surgery. Here are some of the things that will happen before you get to the operating room:  You will have a bracelet that has your name, birth date, and other information on it. Staff may check this bracelet often or ask you your name and birthdate. This is a safety check to make sure they have the right patient.  If you have allergies, you may have to wear a bracelet with them listed on it. You may also have a bracelet to tell staff that you are at a higher risk of falling.  If you have a history of sleep apnea and use a CPAP machine for sleep, please bring the CPAP with you if you are spending the night after your surgery.  You will change out of your clothing and wear a hospital gown. You will need to take off your glasses and remove contact lenses, hearing aids, and dentures before your surgery.  The staff will:  Give you warm blankets or use a warming blanket so you are not cold.  Take your temperature and blood pressure. They may also ask about or check your height and weight.  Ask questions about your health history and allergies. You may have to tell this information to others as well. This is another safety check.  Put an IV in your hand or arm to give you fluids and drugs. You may be given a drug to make you sleepy.  The staff may:  Give you a special mouthwash to use. This helps lower the number of germs in your mouth.  Put special stockings or boots on your legs and feet to help with blood flow.  Use clippers to remove hair around the area of your surgical cut, depending on the site of your surgery.  The anesthesia team will:  Ask about your history and allergies.  Ask you to sign a consent after they speak with you about their anesthesia plan for you.  Want to know if you have any loose teeth before your surgery.  Talk with you about your surgery and what they will do to keep you comfortable during surgery.  Have you meet people who will be in the operating  room with you.  Your doctor will:  Talk with you about your surgery and the risks and benefits. Be sure to ask any questions you may have. You may need to sign a consent form if you have not already done so.  Talk with you about the possible need for blood products. You may be asked to sign a consent for blood products as well.  Sign or hoa the part of your body where you will be having surgery. This is a safety check.    What will the results be?   You will be ready for your surgery.  What drugs may be needed?   The doctor may order drugs before your surgery to:  Help with fear or worry and help you to relax  Prevent infection  Prevent blood clots  The doctor may order drugs after surgery to:  Help lessen pain  Help prevent or lessen upset stomach  Prevent infection  When do I need to call the doctor?   Signs of infection. These include a fever of 100.4°F (38°C) or higher, chills.  If you have a cough, cold, fever, or become ill a few days before you are scheduled to have surgery. Your doctor may want to reschedule it.  If you do not have a ride to and from your surgery  If you have any skin rashes, cuts, boils, or infections on your skin. Your doctor may want to reschedule the surgery since this can put you at risk for wound infection after your surgery.  Helpful tips   Wear loose clothing and comfortable shoes that are easy to put on and take off.  Remove all body piercings before you come for your surgery.  Bring these things with you to the hospital:  Your insurance card and photo ID  A copy of your advance directive if you have one  A list of all drugs that you take and the amounts that you take  A list of all your allergies and the kind of reaction they cause you  Make sure you will be able to move about your home easily after your surgery. You may need extra pillows or a recliner to rest in.  Have foods in your home that will be easy on your belly after surgery. You may want things like juices, crackers,  soups, and Jello.

## 2025-07-22 NOTE — PROGRESS NOTES
Faculty Attestation: Yany Yuan  was seen at [facility] in the Orthopaedic Clinic in the outpatient department at a [facility]. Patient seen and evaluated at time of visit. I agree with resident's assessment and plan.  I participated in the management of the patient and was immediately available throughout the encounter. History of Present Illness, Physical Exam, and Assessment and Plan reviewed. Treatment plan is reasonable and appropriate. Compliance with treatment recommendations is important. No procedures were performed.     Ellis Elizondo MD  [facility] Orthopedic Surgery Chief

## 2025-07-24 ENCOUNTER — TELEPHONE (OUTPATIENT)
Dept: ORTHOPEDICS | Facility: CLINIC | Age: 55
End: 2025-07-24
Payer: MEDICAID

## 2025-07-24 NOTE — TELEPHONE ENCOUNTER
Patient called stating that she can not do the surgery on 7/31/25  due to her prior arrangements and would like to keep her surgery date for  8/7/25 or 8/12/25.      Are we able to put her back on 8/7//25?

## 2025-07-30 ENCOUNTER — ANESTHESIA EVENT (OUTPATIENT)
Dept: SURGERY | Facility: HOSPITAL | Age: 55
End: 2025-07-30
Payer: MEDICAID

## 2025-08-04 ENCOUNTER — TELEPHONE (OUTPATIENT)
Dept: ORTHOPEDICS | Facility: CLINIC | Age: 55
End: 2025-08-04
Payer: MEDICAID

## 2025-08-04 NOTE — TELEPHONE ENCOUNTER
Patient called to inform us that she cannot get in to see her Cardiologist until August 7th and would like to have her surgery pushed back to the 12th of August.    I did explained to her that we need the clearance letter before we can so the surgery.

## 2025-08-07 ENCOUNTER — ANESTHESIA (OUTPATIENT)
Dept: SURGERY | Facility: HOSPITAL | Age: 55
End: 2025-08-07
Payer: MEDICAID

## 2025-08-18 ENCOUNTER — PATIENT MESSAGE (OUTPATIENT)
Dept: SURGERY | Facility: HOSPITAL | Age: 55
End: 2025-08-18
Payer: MEDICAID

## 2025-08-18 RX ORDER — OXYCODONE HYDROCHLORIDE 5 MG/1
5 TABLET ORAL EVERY 6 HOURS PRN
Qty: 25 TABLET | Refills: 0 | Status: SHIPPED | OUTPATIENT
Start: 2025-08-18

## 2025-08-18 RX ORDER — ONDANSETRON 4 MG/1
4 TABLET, FILM COATED ORAL EVERY 8 HOURS PRN
Qty: 15 TABLET | Refills: 0 | Status: SHIPPED | OUTPATIENT
Start: 2025-08-18 | End: 2025-08-25

## 2025-08-18 RX ORDER — ACETAMINOPHEN 500 MG
1000 TABLET ORAL EVERY 8 HOURS
Qty: 90 TABLET | Refills: 0 | Status: SHIPPED | OUTPATIENT
Start: 2025-08-18

## 2025-08-18 RX ORDER — MELOXICAM 15 MG/1
15 TABLET ORAL DAILY
Qty: 14 TABLET | Refills: 0 | Status: SHIPPED | OUTPATIENT
Start: 2025-08-18

## 2025-08-18 RX ORDER — GABAPENTIN 300 MG/1
300 CAPSULE ORAL 3 TIMES DAILY
Qty: 90 CAPSULE | Refills: 0 | Status: SHIPPED | OUTPATIENT
Start: 2025-08-18 | End: 2026-08-18

## 2025-08-19 ENCOUNTER — HOSPITAL ENCOUNTER (OUTPATIENT)
Facility: HOSPITAL | Age: 55
Discharge: HOME | End: 2025-08-19
Attending: ORTHOPAEDIC SURGERY | Admitting: ORTHOPAEDIC SURGERY
Payer: MEDICAID

## 2025-08-19 DIAGNOSIS — M18.11 ARTHRITIS OF CARPOMETACARPAL (CMC) JOINT OF RIGHT THUMB: Primary | ICD-10-CM

## 2025-08-19 PROCEDURE — 63600175 PHARM REV CODE 636 W HCPCS: Performed by: NURSE ANESTHETIST, CERTIFIED REGISTERED

## 2025-08-19 PROCEDURE — 36000709 HC OR TIME LEV III EA ADD 15 MIN: Performed by: ORTHOPAEDIC SURGERY

## 2025-08-19 PROCEDURE — 71000033 HC RECOVERY, INTIAL HOUR: Performed by: ORTHOPAEDIC SURGERY

## 2025-08-19 PROCEDURE — 71000015 HC POSTOP RECOV 1ST HR: Performed by: ORTHOPAEDIC SURGERY

## 2025-08-19 PROCEDURE — 63600175 PHARM REV CODE 636 W HCPCS: Performed by: ANESTHESIOLOGY

## 2025-08-19 PROCEDURE — 37000009 HC ANESTHESIA EA ADD 15 MINS: Performed by: ORTHOPAEDIC SURGERY

## 2025-08-19 PROCEDURE — C1713 ANCHOR/SCREW BN/BN,TIS/BN: HCPCS | Performed by: ORTHOPAEDIC SURGERY

## 2025-08-19 PROCEDURE — 63600175 PHARM REV CODE 636 W HCPCS: Performed by: STUDENT IN AN ORGANIZED HEALTH CARE EDUCATION/TRAINING PROGRAM

## 2025-08-19 PROCEDURE — 37000008 HC ANESTHESIA 1ST 15 MINUTES: Performed by: ORTHOPAEDIC SURGERY

## 2025-08-19 PROCEDURE — 36000708 HC OR TIME LEV III 1ST 15 MIN: Performed by: ORTHOPAEDIC SURGERY

## 2025-08-19 PROCEDURE — 25448 ARTHRP NTRCRPL/CRP/MTCRP SSP: CPT | Mod: RT,,, | Performed by: ORTHOPAEDIC SURGERY

## 2025-08-19 DEVICE — SYS IMPLANT CMC MIN TR 1.1MM: Type: IMPLANTABLE DEVICE | Site: HAND | Status: FUNCTIONAL

## 2025-08-19 RX ORDER — GLUCAGON 1 MG
1 KIT INJECTION
Status: DISCONTINUED | OUTPATIENT
Start: 2025-08-19 | End: 2025-08-19 | Stop reason: HOSPADM

## 2025-08-19 RX ORDER — PROPOFOL 10 MG/ML
VIAL (ML) INTRAVENOUS
Status: DISCONTINUED | OUTPATIENT
Start: 2025-08-19 | End: 2025-08-19

## 2025-08-19 RX ORDER — FENTANYL CITRATE 50 UG/ML
INJECTION, SOLUTION INTRAMUSCULAR; INTRAVENOUS
Status: DISCONTINUED | OUTPATIENT
Start: 2025-08-19 | End: 2025-08-19

## 2025-08-19 RX ORDER — SODIUM CHLORIDE 0.9 % (FLUSH) 0.9 %
10 SYRINGE (ML) INJECTION
Status: DISCONTINUED | OUTPATIENT
Start: 2025-08-19 | End: 2025-08-19 | Stop reason: HOSPADM

## 2025-08-19 RX ORDER — MORPHINE SULFATE 2 MG/ML
2 INJECTION, SOLUTION INTRAMUSCULAR; INTRAVENOUS EVERY 5 MIN PRN
Status: DISCONTINUED | OUTPATIENT
Start: 2025-08-19 | End: 2025-08-19 | Stop reason: HOSPADM

## 2025-08-19 RX ORDER — CEFAZOLIN SODIUM 1 G/3ML
2 INJECTION, POWDER, FOR SOLUTION INTRAMUSCULAR; INTRAVENOUS
Status: COMPLETED | OUTPATIENT
Start: 2025-08-19 | End: 2025-08-19

## 2025-08-19 RX ORDER — MEPERIDINE HYDROCHLORIDE 25 MG/ML
12.5 INJECTION INTRAMUSCULAR; INTRAVENOUS; SUBCUTANEOUS EVERY 10 MIN PRN
Status: DISCONTINUED | OUTPATIENT
Start: 2025-08-19 | End: 2025-08-19 | Stop reason: HOSPADM

## 2025-08-19 RX ORDER — KETOROLAC TROMETHAMINE 30 MG/ML
INJECTION, SOLUTION INTRAMUSCULAR; INTRAVENOUS
Status: DISCONTINUED | OUTPATIENT
Start: 2025-08-19 | End: 2025-08-19

## 2025-08-19 RX ORDER — OXYCODONE HYDROCHLORIDE 5 MG/1
5 TABLET ORAL
Status: DISCONTINUED | OUTPATIENT
Start: 2025-08-19 | End: 2025-08-19 | Stop reason: HOSPADM

## 2025-08-19 RX ORDER — MUPIROCIN 20 MG/G
OINTMENT TOPICAL
Status: DISCONTINUED | OUTPATIENT
Start: 2025-08-19 | End: 2025-08-19 | Stop reason: HOSPADM

## 2025-08-19 RX ORDER — ONDANSETRON HYDROCHLORIDE 2 MG/ML
4 INJECTION, SOLUTION INTRAVENOUS DAILY PRN
Status: DISCONTINUED | OUTPATIENT
Start: 2025-08-19 | End: 2025-08-19 | Stop reason: HOSPADM

## 2025-08-19 RX ORDER — DEXAMETHASONE SODIUM PHOSPHATE 4 MG/ML
INJECTION, SOLUTION INTRA-ARTICULAR; INTRALESIONAL; INTRAMUSCULAR; INTRAVENOUS; SOFT TISSUE
Status: DISCONTINUED | OUTPATIENT
Start: 2025-08-19 | End: 2025-08-19

## 2025-08-19 RX ORDER — SODIUM CHLORIDE 9 MG/ML
INJECTION, SOLUTION INTRAVENOUS CONTINUOUS
Status: DISCONTINUED | OUTPATIENT
Start: 2025-08-19 | End: 2025-08-19 | Stop reason: HOSPADM

## 2025-08-19 RX ORDER — LIDOCAINE HYDROCHLORIDE 20 MG/ML
INJECTION, SOLUTION EPIDURAL; INFILTRATION; INTRACAUDAL; PERINEURAL
Status: DISCONTINUED | OUTPATIENT
Start: 2025-08-19 | End: 2025-08-19

## 2025-08-19 RX ORDER — MIDAZOLAM HYDROCHLORIDE 2 MG/2ML
2 INJECTION, SOLUTION INTRAMUSCULAR; INTRAVENOUS ONCE AS NEEDED
Status: COMPLETED | OUTPATIENT
Start: 2025-08-19 | End: 2025-08-19

## 2025-08-19 RX ORDER — SODIUM CHLORIDE, SODIUM LACTATE, POTASSIUM CHLORIDE, CALCIUM CHLORIDE 600; 310; 30; 20 MG/100ML; MG/100ML; MG/100ML; MG/100ML
INJECTION, SOLUTION INTRAVENOUS CONTINUOUS
Status: DISCONTINUED | OUTPATIENT
Start: 2025-08-19 | End: 2025-08-19 | Stop reason: HOSPADM

## 2025-08-19 RX ADMIN — ONDANSETRON 4 MG: 2 INJECTION INTRAMUSCULAR; INTRAVENOUS at 09:08

## 2025-08-19 RX ADMIN — LIDOCAINE HYDROCHLORIDE 50 MG: 20 INJECTION, SOLUTION EPIDURAL; INFILTRATION; INTRACAUDAL; PERINEURAL at 09:08

## 2025-08-19 RX ADMIN — PROPOFOL 200 MG: 10 INJECTION, EMULSION INTRAVENOUS at 09:08

## 2025-08-19 RX ADMIN — MIDAZOLAM HYDROCHLORIDE 2 MG: 1 INJECTION, SOLUTION INTRAMUSCULAR; INTRAVENOUS at 09:08

## 2025-08-19 RX ADMIN — SODIUM CHLORIDE, POTASSIUM CHLORIDE, SODIUM LACTATE AND CALCIUM CHLORIDE: 600; 310; 30; 20 INJECTION, SOLUTION INTRAVENOUS at 09:08

## 2025-08-19 RX ADMIN — MORPHINE SULFATE 2 MG: 2 INJECTION, SOLUTION INTRAMUSCULAR; INTRAVENOUS at 11:08

## 2025-08-19 RX ADMIN — DEXAMETHASONE SODIUM PHOSPHATE 8 MG: 4 INJECTION, SOLUTION INTRA-ARTICULAR; INTRALESIONAL; INTRAMUSCULAR; INTRAVENOUS; SOFT TISSUE at 09:08

## 2025-08-19 RX ADMIN — CEFAZOLIN 2 G: 330 INJECTION, POWDER, FOR SOLUTION INTRAMUSCULAR; INTRAVENOUS at 09:08

## 2025-08-19 RX ADMIN — FENTANYL CITRATE 100 MCG: 50 INJECTION, SOLUTION INTRAMUSCULAR; INTRAVENOUS at 10:08

## 2025-08-19 RX ADMIN — KETOROLAC TROMETHAMINE 30 MG: 30 INJECTION INTRAMUSCULAR; INTRAVENOUS at 09:08

## 2025-08-20 VITALS
HEART RATE: 95 BPM | TEMPERATURE: 97 F | WEIGHT: 170.38 LBS | SYSTOLIC BLOOD PRESSURE: 120 MMHG | BODY MASS INDEX: 34.42 KG/M2 | DIASTOLIC BLOOD PRESSURE: 79 MMHG | RESPIRATION RATE: 18 BRPM | OXYGEN SATURATION: 98 %

## 2025-09-03 ENCOUNTER — OFFICE VISIT (OUTPATIENT)
Facility: CLINIC | Age: 55
End: 2025-09-03
Payer: MEDICAID

## 2025-09-03 ENCOUNTER — HOSPITAL ENCOUNTER (OUTPATIENT)
Facility: HOSPITAL | Age: 55
Discharge: HOME | End: 2025-09-03
Attending: STUDENT IN AN ORGANIZED HEALTH CARE EDUCATION/TRAINING PROGRAM
Payer: MEDICAID

## 2025-09-03 VITALS
TEMPERATURE: 98 F | RESPIRATION RATE: 20 BRPM | SYSTOLIC BLOOD PRESSURE: 136 MMHG | WEIGHT: 177.5 LBS | OXYGEN SATURATION: 97 % | BODY MASS INDEX: 35.78 KG/M2 | HEIGHT: 59 IN | DIASTOLIC BLOOD PRESSURE: 82 MMHG | HEART RATE: 89 BPM

## 2025-09-03 DIAGNOSIS — M18.11 ARTHRITIS OF CARPOMETACARPAL (CMC) JOINT OF RIGHT THUMB: ICD-10-CM

## 2025-09-03 DIAGNOSIS — M18.11 ARTHRITIS OF CARPOMETACARPAL (CMC) JOINT OF RIGHT THUMB: Primary | ICD-10-CM

## 2025-09-03 PROCEDURE — 73130 X-RAY EXAM OF HAND: CPT | Mod: TC,PN,RT

## 2025-09-03 PROCEDURE — 29075 APPL CST ELBW FNGR SHORT ARM: CPT | Mod: PBBFAC,PN | Performed by: ORTHOPAEDIC SURGERY

## 2025-09-03 PROCEDURE — 99215 OFFICE O/P EST HI 40 MIN: CPT | Mod: PBBFAC,25,PN

## (undated) DEVICE — ELECTRODE PATIENT RETURN DISP

## (undated) DEVICE — SYR 10CC LUER LOCK

## (undated) DEVICE — BANDAGE VELCLOSE ELAS 2INX5YD

## (undated) DEVICE — Device

## (undated) DEVICE — SPLINT PLASTER EXT FAST 4X15

## (undated) DEVICE — SUT MCRYL PLUS 4-0 PS2 27IN

## (undated) DEVICE — DRESSING XEROFORM NONADH 1X8IN

## (undated) DEVICE — CUFF ATS 2 PORT SNGL BLDR 18IN

## (undated) DEVICE — SOL NACL IRR 1000ML BTL

## (undated) DEVICE — NDL ECLIPSE SAF REG 25GX1.5IN

## (undated) DEVICE — CORD BIPOLAR 12 FOOT

## (undated) DEVICE — BLADE SURG STAINLESS STEEL #15

## (undated) DEVICE — SUT 5-0 MONO P-3 18IN

## (undated) DEVICE — DRAPE HAND STERILE

## (undated) DEVICE — GOWN NONREINF SET-IN SLV XL

## (undated) DEVICE — SUT VICRYL RAPIDE 4-0 18 P

## (undated) DEVICE — BANDAGE ESMARK LATEX FREE 4INX

## (undated) DEVICE — SUT 4-0 ETHILON 18 PS-2

## (undated) DEVICE — GLOVE PROTEXIS BLUE LATEX 7.5

## (undated) DEVICE — DRAPE C-ARM MINI POLY 54X84IN

## (undated) DEVICE — APPLICATOR CHLORAPREP ORN 26ML

## (undated) DEVICE — KIT SURGICAL TURNOVER

## (undated) DEVICE — GLOVE PROTEXIS LTX MICRO  7

## (undated) DEVICE — GAUZE BANDAGE CONFORM 2X75IN